# Patient Record
Sex: FEMALE | Race: BLACK OR AFRICAN AMERICAN | Employment: FULL TIME | URBAN - METROPOLITAN AREA
[De-identification: names, ages, dates, MRNs, and addresses within clinical notes are randomized per-mention and may not be internally consistent; named-entity substitution may affect disease eponyms.]

---

## 2018-02-20 PROBLEM — E66.01 OBESITY, MORBID (HCC): Status: ACTIVE | Noted: 2018-02-20

## 2018-03-28 ENCOUNTER — HOSPITAL ENCOUNTER (OUTPATIENT)
Dept: SLEEP MEDICINE | Age: 58
Discharge: HOME | End: 2018-03-28
Payer: COMMERCIAL

## 2018-03-28 PROCEDURE — 95811 POLYSOM 6/>YRS CPAP 4/> PARM: CPT

## 2018-04-19 PROBLEM — G47.33 OSA (OBSTRUCTIVE SLEEP APNEA): Status: ACTIVE | Noted: 2018-04-19

## 2019-11-17 ENCOUNTER — APPOINTMENT (OUTPATIENT)
Dept: CT IMAGING | Age: 59
DRG: 355 | End: 2019-11-17
Attending: EMERGENCY MEDICINE
Payer: COMMERCIAL

## 2019-11-17 ENCOUNTER — HOSPITAL ENCOUNTER (INPATIENT)
Age: 59
LOS: 2 days | Discharge: HOME | DRG: 355 | End: 2019-11-19
Attending: EMERGENCY MEDICINE | Admitting: SURGERY
Payer: COMMERCIAL

## 2019-11-17 DIAGNOSIS — K42.0 INCARCERATED UMBILICAL HERNIA: ICD-10-CM

## 2019-11-17 DIAGNOSIS — K56.609 SMALL BOWEL OBSTRUCTION (HCC): Primary | ICD-10-CM

## 2019-11-17 LAB
ALBUMIN SERPL-MCNC: 4 G/DL (ref 3.2–4.6)
ALBUMIN/GLOB SERPL: 0.9 {RATIO} (ref 1.2–3.5)
ALP SERPL-CCNC: 69 U/L (ref 50–130)
ALT SERPL-CCNC: 25 U/L (ref 12–65)
ANION GAP SERPL CALC-SCNC: 7 MMOL/L (ref 7–16)
AST SERPL-CCNC: 17 U/L (ref 15–37)
BACTERIA URNS QL MICRO: 0 /HPF
BASOPHILS # BLD: 0 K/UL (ref 0–0.2)
BASOPHILS NFR BLD: 0 % (ref 0–2)
BILIRUB SERPL-MCNC: 0.7 MG/DL (ref 0.2–1.1)
BUN SERPL-MCNC: 17 MG/DL (ref 8–23)
CALCIUM SERPL-MCNC: 9.8 MG/DL (ref 8.3–10.4)
CASTS URNS QL MICRO: ABNORMAL /LPF
CHLORIDE SERPL-SCNC: 102 MMOL/L (ref 98–107)
CO2 SERPL-SCNC: 29 MMOL/L (ref 21–32)
CREAT SERPL-MCNC: 0.71 MG/DL (ref 0.6–1)
CRYSTALS URNS QL MICRO: 0 /LPF
DIFFERENTIAL METHOD BLD: ABNORMAL
EOSINOPHIL # BLD: 0 K/UL (ref 0–0.8)
EOSINOPHIL NFR BLD: 0 % (ref 0.5–7.8)
EPI CELLS #/AREA URNS HPF: ABNORMAL /HPF
ERYTHROCYTE [DISTWIDTH] IN BLOOD BY AUTOMATED COUNT: 14.5 % (ref 11.9–14.6)
GLOBULIN SER CALC-MCNC: 4.6 G/DL (ref 2.3–3.5)
GLUCOSE SERPL-MCNC: 149 MG/DL (ref 65–100)
HCT VFR BLD AUTO: 42.8 % (ref 35.8–46.3)
HGB BLD-MCNC: 13.7 G/DL (ref 11.7–15.4)
IMM GRANULOCYTES # BLD AUTO: 0 K/UL (ref 0–0.5)
IMM GRANULOCYTES NFR BLD AUTO: 0 % (ref 0–5)
LYMPHOCYTES # BLD: 0.6 K/UL (ref 0.5–4.6)
LYMPHOCYTES NFR BLD: 6 % (ref 13–44)
MCH RBC QN AUTO: 26.9 PG (ref 26.1–32.9)
MCHC RBC AUTO-ENTMCNC: 32 G/DL (ref 31.4–35)
MCV RBC AUTO: 84.1 FL (ref 79.6–97.8)
MONOCYTES # BLD: 0.2 K/UL (ref 0.1–1.3)
MONOCYTES NFR BLD: 2 % (ref 4–12)
MUCOUS THREADS URNS QL MICRO: ABNORMAL /LPF
NEUTS SEG # BLD: 9.1 K/UL (ref 1.7–8.2)
NEUTS SEG NFR BLD: 91 % (ref 43–78)
NRBC # BLD: 0 K/UL (ref 0–0.2)
PLATELET # BLD AUTO: 358 K/UL (ref 150–450)
PMV BLD AUTO: 8.7 FL (ref 9.4–12.3)
POTASSIUM SERPL-SCNC: 3.7 MMOL/L (ref 3.5–5.1)
PROT SERPL-MCNC: 8.6 G/DL (ref 6.3–8.2)
RBC # BLD AUTO: 5.09 M/UL (ref 4.05–5.2)
RBC #/AREA URNS HPF: 0 /HPF
SODIUM SERPL-SCNC: 138 MMOL/L (ref 136–145)
WBC # BLD AUTO: 10 K/UL (ref 4.3–11.1)
WBC URNS QL MICRO: ABNORMAL /HPF

## 2019-11-17 PROCEDURE — 74011000250 HC RX REV CODE- 250: Performed by: EMERGENCY MEDICINE

## 2019-11-17 PROCEDURE — 96374 THER/PROPH/DIAG INJ IV PUSH: CPT | Performed by: EMERGENCY MEDICINE

## 2019-11-17 PROCEDURE — 81003 URINALYSIS AUTO W/O SCOPE: CPT | Performed by: EMERGENCY MEDICINE

## 2019-11-17 PROCEDURE — 74011250636 HC RX REV CODE- 250/636: Performed by: SURGERY

## 2019-11-17 PROCEDURE — 74011250637 HC RX REV CODE- 250/637: Performed by: EMERGENCY MEDICINE

## 2019-11-17 PROCEDURE — 99284 EMERGENCY DEPT VISIT MOD MDM: CPT | Performed by: EMERGENCY MEDICINE

## 2019-11-17 PROCEDURE — 77030020263 HC SOL INJ SOD CL0.9% LFCR 1000ML

## 2019-11-17 PROCEDURE — 74011250636 HC RX REV CODE- 250/636: Performed by: EMERGENCY MEDICINE

## 2019-11-17 PROCEDURE — 74011636320 HC RX REV CODE- 636/320: Performed by: EMERGENCY MEDICINE

## 2019-11-17 PROCEDURE — 96361 HYDRATE IV INFUSION ADD-ON: CPT | Performed by: EMERGENCY MEDICINE

## 2019-11-17 PROCEDURE — 74177 CT ABD & PELVIS W/CONTRAST: CPT

## 2019-11-17 PROCEDURE — 85025 COMPLETE CBC W/AUTO DIFF WBC: CPT

## 2019-11-17 PROCEDURE — 80053 COMPREHEN METABOLIC PANEL: CPT

## 2019-11-17 PROCEDURE — 81015 MICROSCOPIC EXAM OF URINE: CPT

## 2019-11-17 PROCEDURE — 74011000258 HC RX REV CODE- 258: Performed by: EMERGENCY MEDICINE

## 2019-11-17 PROCEDURE — 65270000029 HC RM PRIVATE

## 2019-11-17 RX ORDER — HYOSCYAMINE SULFATE 0.12 MG/1
0.25 TABLET SUBLINGUAL
Status: COMPLETED | OUTPATIENT
Start: 2019-11-17 | End: 2019-11-17

## 2019-11-17 RX ORDER — SODIUM CHLORIDE 9 MG/ML
75 INJECTION, SOLUTION INTRAVENOUS CONTINUOUS
Status: DISCONTINUED | OUTPATIENT
Start: 2019-11-17 | End: 2019-11-19 | Stop reason: HOSPADM

## 2019-11-17 RX ORDER — CLINDAMYCIN PHOSPHATE 900 MG/50ML
900 INJECTION INTRAVENOUS
Status: DISCONTINUED | OUTPATIENT
Start: 2019-11-17 | End: 2019-11-18 | Stop reason: SDUPTHER

## 2019-11-17 RX ORDER — SODIUM CHLORIDE 0.9 % (FLUSH) 0.9 %
10 SYRINGE (ML) INJECTION
Status: COMPLETED | OUTPATIENT
Start: 2019-11-17 | End: 2019-11-17

## 2019-11-17 RX ORDER — ONDANSETRON 2 MG/ML
4 INJECTION INTRAMUSCULAR; INTRAVENOUS
Status: COMPLETED | OUTPATIENT
Start: 2019-11-17 | End: 2019-11-17

## 2019-11-17 RX ORDER — HYDROMORPHONE HYDROCHLORIDE 1 MG/ML
0.5 INJECTION, SOLUTION INTRAMUSCULAR; INTRAVENOUS; SUBCUTANEOUS
Status: DISCONTINUED | OUTPATIENT
Start: 2019-11-17 | End: 2019-11-19 | Stop reason: HOSPADM

## 2019-11-17 RX ORDER — ONDANSETRON 2 MG/ML
4 INJECTION INTRAMUSCULAR; INTRAVENOUS
Status: DISCONTINUED | OUTPATIENT
Start: 2019-11-17 | End: 2019-11-19 | Stop reason: HOSPADM

## 2019-11-17 RX ORDER — SODIUM CHLORIDE 0.9 % (FLUSH) 0.9 %
5-40 SYRINGE (ML) INJECTION AS NEEDED
Status: DISCONTINUED | OUTPATIENT
Start: 2019-11-17 | End: 2019-11-19 | Stop reason: HOSPADM

## 2019-11-17 RX ORDER — SODIUM CHLORIDE 0.9 % (FLUSH) 0.9 %
5-40 SYRINGE (ML) INJECTION EVERY 8 HOURS
Status: DISCONTINUED | OUTPATIENT
Start: 2019-11-17 | End: 2019-11-19 | Stop reason: HOSPADM

## 2019-11-17 RX ADMIN — Medication 10 ML: at 21:23

## 2019-11-17 RX ADMIN — FAMOTIDINE 20 MG: 10 INJECTION, SOLUTION INTRAVENOUS at 22:27

## 2019-11-17 RX ADMIN — HYOSCYAMINE SULFATE 0.25 MG: 0.12 TABLET ORAL; SUBLINGUAL at 21:09

## 2019-11-17 RX ADMIN — SODIUM CHLORIDE 1000 ML: 900 INJECTION, SOLUTION INTRAVENOUS at 21:09

## 2019-11-17 RX ADMIN — ONDANSETRON 4 MG: 2 INJECTION INTRAMUSCULAR; INTRAVENOUS at 21:09

## 2019-11-17 RX ADMIN — SODIUM CHLORIDE 125 ML/HR: 900 INJECTION, SOLUTION INTRAVENOUS at 23:10

## 2019-11-17 RX ADMIN — Medication 5 ML: at 22:07

## 2019-11-17 RX ADMIN — IOPAMIDOL 100 ML: 755 INJECTION, SOLUTION INTRAVENOUS at 21:23

## 2019-11-17 RX ADMIN — SODIUM CHLORIDE 100 ML: 900 INJECTION, SOLUTION INTRAVENOUS at 21:24

## 2019-11-18 ENCOUNTER — ANESTHESIA EVENT (OUTPATIENT)
Dept: SURGERY | Age: 59
DRG: 355 | End: 2019-11-18
Payer: COMMERCIAL

## 2019-11-18 ENCOUNTER — ANESTHESIA (OUTPATIENT)
Dept: SURGERY | Age: 59
DRG: 355 | End: 2019-11-18
Payer: COMMERCIAL

## 2019-11-18 LAB
ABO + RH BLD: NORMAL
APTT PPP: 24.8 SEC (ref 24.7–39.8)
BLOOD GROUP ANTIBODIES SERPL: NORMAL
INR PPP: 1
PROTHROMBIN TIME: 13.4 SEC (ref 11.7–14.5)
SPECIMEN EXP DATE BLD: NORMAL

## 2019-11-18 PROCEDURE — 65270000029 HC RM PRIVATE

## 2019-11-18 PROCEDURE — 77030019908 HC STETH ESOPH SIMS -A: Performed by: NURSE ANESTHETIST, CERTIFIED REGISTERED

## 2019-11-18 PROCEDURE — 77030003029 HC SUT VCRL J&J -B: Performed by: SURGERY

## 2019-11-18 PROCEDURE — 0WQF0ZZ REPAIR ABDOMINAL WALL, OPEN APPROACH: ICD-10-PCS | Performed by: SURGERY

## 2019-11-18 PROCEDURE — 76210000016 HC OR PH I REC 1 TO 1.5 HR: Performed by: SURGERY

## 2019-11-18 PROCEDURE — 77030020263 HC SOL INJ SOD CL0.9% LFCR 1000ML

## 2019-11-18 PROCEDURE — 77030002996 HC SUT SLK J&J -A: Performed by: SURGERY

## 2019-11-18 PROCEDURE — 74011250636 HC RX REV CODE- 250/636: Performed by: SURGERY

## 2019-11-18 PROCEDURE — 77030008462 HC STPLR SKN PROX J&J -A: Performed by: SURGERY

## 2019-11-18 PROCEDURE — 77030039425 HC BLD LARYNG TRULITE DISP TELE -A: Performed by: ANESTHESIOLOGY

## 2019-11-18 PROCEDURE — 85610 PROTHROMBIN TIME: CPT

## 2019-11-18 PROCEDURE — 86900 BLOOD TYPING SEROLOGIC ABO: CPT

## 2019-11-18 PROCEDURE — 77030040361 HC SLV COMPR DVT MDII -B: Performed by: SURGERY

## 2019-11-18 PROCEDURE — 74011000250 HC RX REV CODE- 250: Performed by: NURSE ANESTHETIST, CERTIFIED REGISTERED

## 2019-11-18 PROCEDURE — 77030037088 HC TUBE ENDOTRACH ORAL NSL COVD-A: Performed by: ANESTHESIOLOGY

## 2019-11-18 PROCEDURE — 77030020255 HC SOL INJ LR 1000ML BG

## 2019-11-18 PROCEDURE — 74011250636 HC RX REV CODE- 250/636: Performed by: NURSE ANESTHETIST, CERTIFIED REGISTERED

## 2019-11-18 PROCEDURE — 77030002966 HC SUT PDS J&J -A: Performed by: SURGERY

## 2019-11-18 PROCEDURE — 74011250637 HC RX REV CODE- 250/637: Performed by: SURGERY

## 2019-11-18 PROCEDURE — 76060000033 HC ANESTHESIA 1 TO 1.5 HR: Performed by: SURGERY

## 2019-11-18 PROCEDURE — 36415 COLL VENOUS BLD VENIPUNCTURE: CPT

## 2019-11-18 PROCEDURE — 85730 THROMBOPLASTIN TIME PARTIAL: CPT

## 2019-11-18 PROCEDURE — 77030040830 HC CATH URETH FOL MDII -A: Performed by: SURGERY

## 2019-11-18 PROCEDURE — 76010000161 HC OR TIME 1 TO 1.5 HR INTENSV-TIER 1: Performed by: SURGERY

## 2019-11-18 RX ORDER — CLINDAMYCIN PHOSPHATE 900 MG/50ML
900 INJECTION INTRAVENOUS
Status: COMPLETED | OUTPATIENT
Start: 2019-11-18 | End: 2019-11-18

## 2019-11-18 RX ORDER — SUCCINYLCHOLINE CHLORIDE 20 MG/ML
INJECTION INTRAMUSCULAR; INTRAVENOUS AS NEEDED
Status: DISCONTINUED | OUTPATIENT
Start: 2019-11-18 | End: 2019-11-18 | Stop reason: HOSPADM

## 2019-11-18 RX ORDER — PANTOPRAZOLE SODIUM 40 MG/1
40 TABLET, DELAYED RELEASE ORAL
Status: DISCONTINUED | OUTPATIENT
Start: 2019-11-18 | End: 2019-11-19 | Stop reason: HOSPADM

## 2019-11-18 RX ORDER — KETAMINE HYDROCHLORIDE 50 MG/ML
INJECTION, SOLUTION INTRAMUSCULAR; INTRAVENOUS AS NEEDED
Status: DISCONTINUED | OUTPATIENT
Start: 2019-11-18 | End: 2019-11-18 | Stop reason: HOSPADM

## 2019-11-18 RX ORDER — ONDANSETRON 2 MG/ML
INJECTION INTRAMUSCULAR; INTRAVENOUS AS NEEDED
Status: DISCONTINUED | OUTPATIENT
Start: 2019-11-18 | End: 2019-11-18 | Stop reason: HOSPADM

## 2019-11-18 RX ORDER — PROPOFOL 10 MG/ML
INJECTION, EMULSION INTRAVENOUS AS NEEDED
Status: DISCONTINUED | OUTPATIENT
Start: 2019-11-18 | End: 2019-11-18 | Stop reason: HOSPADM

## 2019-11-18 RX ORDER — ACETAMINOPHEN 10 MG/ML
INJECTION, SOLUTION INTRAVENOUS AS NEEDED
Status: DISCONTINUED | OUTPATIENT
Start: 2019-11-18 | End: 2019-11-18 | Stop reason: HOSPADM

## 2019-11-18 RX ORDER — DOCUSATE SODIUM 100 MG/1
100 CAPSULE, LIQUID FILLED ORAL 2 TIMES DAILY
Status: DISCONTINUED | OUTPATIENT
Start: 2019-11-18 | End: 2019-11-19 | Stop reason: HOSPADM

## 2019-11-18 RX ORDER — ROCURONIUM BROMIDE 10 MG/ML
INJECTION, SOLUTION INTRAVENOUS AS NEEDED
Status: DISCONTINUED | OUTPATIENT
Start: 2019-11-18 | End: 2019-11-18 | Stop reason: HOSPADM

## 2019-11-18 RX ORDER — KETOROLAC TROMETHAMINE 30 MG/ML
INJECTION, SOLUTION INTRAMUSCULAR; INTRAVENOUS AS NEEDED
Status: DISCONTINUED | OUTPATIENT
Start: 2019-11-18 | End: 2019-11-18 | Stop reason: HOSPADM

## 2019-11-18 RX ORDER — LIDOCAINE HYDROCHLORIDE 20 MG/ML
INJECTION, SOLUTION EPIDURAL; INFILTRATION; INTRACAUDAL; PERINEURAL AS NEEDED
Status: DISCONTINUED | OUTPATIENT
Start: 2019-11-18 | End: 2019-11-18 | Stop reason: HOSPADM

## 2019-11-18 RX ORDER — FENTANYL CITRATE 50 UG/ML
INJECTION, SOLUTION INTRAMUSCULAR; INTRAVENOUS AS NEEDED
Status: DISCONTINUED | OUTPATIENT
Start: 2019-11-18 | End: 2019-11-18 | Stop reason: HOSPADM

## 2019-11-18 RX ORDER — EPHEDRINE SULFATE/0.9% NACL/PF 50 MG/5 ML
SYRINGE (ML) INTRAVENOUS AS NEEDED
Status: DISCONTINUED | OUTPATIENT
Start: 2019-11-18 | End: 2019-11-18 | Stop reason: HOSPADM

## 2019-11-18 RX ORDER — SODIUM CHLORIDE, SODIUM LACTATE, POTASSIUM CHLORIDE, CALCIUM CHLORIDE 600; 310; 30; 20 MG/100ML; MG/100ML; MG/100ML; MG/100ML
INJECTION, SOLUTION INTRAVENOUS
Status: DISCONTINUED | OUTPATIENT
Start: 2019-11-18 | End: 2019-11-18 | Stop reason: HOSPADM

## 2019-11-18 RX ADMIN — SODIUM CHLORIDE 125 ML/HR: 900 INJECTION, SOLUTION INTRAVENOUS at 08:19

## 2019-11-18 RX ADMIN — PANTOPRAZOLE SODIUM 40 MG: 40 TABLET, DELAYED RELEASE ORAL at 23:15

## 2019-11-18 RX ADMIN — KETAMINE HYDROCHLORIDE 50 MG: 50 INJECTION INTRAMUSCULAR; INTRAVENOUS at 17:35

## 2019-11-18 RX ADMIN — LIDOCAINE HYDROCHLORIDE 100 MG: 20 INJECTION, SOLUTION EPIDURAL; INFILTRATION; INTRACAUDAL; PERINEURAL at 17:24

## 2019-11-18 RX ADMIN — FENTANYL CITRATE 100 MCG: 50 INJECTION INTRAMUSCULAR; INTRAVENOUS at 17:21

## 2019-11-18 RX ADMIN — PROPOFOL 200 MG: 10 INJECTION, EMULSION INTRAVENOUS at 17:24

## 2019-11-18 RX ADMIN — ROCURONIUM BROMIDE 20 MG: 10 INJECTION, SOLUTION INTRAVENOUS at 17:35

## 2019-11-18 RX ADMIN — Medication 10 MG: at 17:24

## 2019-11-18 RX ADMIN — SUGAMMADEX 400 MG: 100 INJECTION, SOLUTION INTRAVENOUS at 18:05

## 2019-11-18 RX ADMIN — ACETAMINOPHEN 1000 MG: 10 INJECTION, SOLUTION INTRAVENOUS at 17:40

## 2019-11-18 RX ADMIN — ROCURONIUM BROMIDE 5 MG: 10 INJECTION, SOLUTION INTRAVENOUS at 17:24

## 2019-11-18 RX ADMIN — ONDANSETRON 4 MG: 2 INJECTION INTRAMUSCULAR; INTRAVENOUS at 17:30

## 2019-11-18 RX ADMIN — SODIUM CHLORIDE, SODIUM LACTATE, POTASSIUM CHLORIDE, AND CALCIUM CHLORIDE: 600; 310; 30; 20 INJECTION, SOLUTION INTRAVENOUS at 17:15

## 2019-11-18 RX ADMIN — CLINDAMYCIN PHOSPHATE 900 MG: 900 INJECTION, SOLUTION INTRAVENOUS at 17:15

## 2019-11-18 RX ADMIN — KETOROLAC TROMETHAMINE 30 MG: 30 INJECTION, SOLUTION INTRAMUSCULAR; INTRAVENOUS at 17:56

## 2019-11-18 RX ADMIN — SUCCINYLCHOLINE CHLORIDE 120 MG: 20 INJECTION, SOLUTION INTRAMUSCULAR; INTRAVENOUS at 17:24

## 2019-11-18 RX ADMIN — SODIUM CHLORIDE, SODIUM LACTATE, POTASSIUM CHLORIDE, AND CALCIUM CHLORIDE: 600; 310; 30; 20 INJECTION, SOLUTION INTRAVENOUS at 18:25

## 2019-11-18 NOTE — PROGRESS NOTES
Surgery and blood transfusion consent signed by pt and placed in chart. Pt has also completed her CHG bath. Will continue to monitor.

## 2019-11-18 NOTE — PROGRESS NOTES
TRANSFER - IN REPORT:    Verbal report received from Hien Fountain RN (name) on [facility] Caldwell  being received from 3rd med-surg (unit) for ordered procedure      Report consisted of patients Situation, Background, Assessment and   Recommendations(SBAR). Information from the following report(s) SBAR, Kardex and MAR was reviewed with the receiving nurse. Opportunity for questions and clarification was provided. Assessment completed upon patients arrival to unit and care assumed.

## 2019-11-18 NOTE — PROGRESS NOTES
TRANSFER - IN REPORT:    Verbal report received from 809 Michael Coreas RN(name) on [facility]  being received from ED(unit) for routine progression of care      Report consisted of patients Situation, Background, Assessment and   Recommendations(SBAR). Information from the following report(s) SBAR, Kardex, ED Summary, [facility] and Recent Results was reviewed with the receiving nurse. Opportunity for questions and clarification was provided. Assessment completed upon patients arrival to unit and care assumed.

## 2019-11-18 NOTE — PROGRESS NOTES
Shift assessment complete. Pt alert and oriented x4. Respirations even and unlabored. Lung sounds clear on room air. HR regular. Abdomen soft with hypoactive bowel sounds heard in all four quadrants. Skin intact, trace edema noted to BLE. IV patent and infusing. NGT intact to right nare to LIS per MD order. NGT canister with sanguinous output measuring at 150 mL. Pt NPO, verbalizes understanding. Pt denies pain and nausea. All needs met at this time. Safety measures in place, call light within reach, family at bedside. Will continue to monitor.

## 2019-11-18 NOTE — H&P
H&P/Consult Note/Progress Note/Office Note:   Annamaria Ann  MRN: [id number]  :1960  Age:59 y.o.    HPI: Annamaria Ann is a 60 y.o. female who is seen for incarcerated umbilical hernia. She had this hernia for long time, she developed abdominal pain with nausea, vomiting over last two days, and she went to ED, CT confirmed incarcerated hernia with SBO. She was hemodynamically stable with benign abdominal exam. Labs was unremarkable. She is obese, diabetic and hypertensive. Other medical issues are as below.            Past Medical History:   Diagnosis Date    Allergic rhinitis     mild, seasonal    Diabetes mellitus type 2, controlled ([address]     Hypertension     Iron deficiency anemia     Postmenopausal      Past Surgical History:   Procedure Laterality Date    HX OPEN REDUCTION INTERNAL FIXATION      right femur,      Current Facility-Administered Medications   Medication Dose Route Frequency    sodium chloride (NS) flush 5-40 mL  5-40 mL IntraVENous Q8H    sodium chloride (NS) flush 5-40 mL  5-40 mL IntraVENous PRN    0.9% sodium chloride infusion  125 mL/hr IntraVENous CONTINUOUS    HYDROmorphone (PF) (DILAUDID) injection 0.5 mg  0.5 mg IntraVENous Q4H PRN    ondansetron (ZOFRAN) injection 4 mg  4 mg IntraVENous Q4H PRN    clindamycin (CLEOCIN) 900mg D5W 50mL IVPB (premix)  900 mg IntraVENous ON CALL TO OR     Penicillins  Social History     Socioeconomic History    Marital status:      Spouse name: Not on file    Number of children: 0    Years of education: Not on file    Highest education level: Not on file   Tobacco Use    Smoking status: Never Smoker    Smokeless tobacco: Never Used   Substance and Sexual Activity    Alcohol use: No     Alcohol/week: 0.0 standard drinks     Comment: none    Drug use: No   Other Topics Concern    Special Diet No    Exercise No   Social History Narrative    , [place], in [place] [employer] 32yo      works [employer], Tech specialist at [address] History     Tobacco Use   Smoking Status Never Smoker   Smokeless Tobacco Never Used     Family History   Problem Relation Age of Onset    Arthritis-osteo Mother     Breast Cancer Maternal Aunt 36    Breast Cancer Maternal Aunt 36    Liver Disease Father         alcoholic cirrhosis     ROS: The patient has no difficulty with chest pain or shortness of breath. No fever or chills. Comprehensive review of systems was otherwise unremarkable except as noted above. Physical Exam:   Visit Vitals  /71 (BP 1 Location: Right arm, BP Patient Position: At rest;Supine; Head of bed elevated (Comment degrees))   Pulse 91   Temp 98.1 °F (36.7 °C)   Resp 18   SpO2 98%     Vitals:    11/17/19 1919 11/17/19 2257 11/18/19 0301   BP: 153/63 145/80 128/71   Pulse: 99 92 91   Resp: 18 18 18   Temp: 97.8 °F (36.6 °C) 98 °F (36.7 °C) 98.1 °F (36.7 °C)   SpO2: 96% 98% 98%     11/17 1901 - 11/18 0700  In: 1100 [I.V.:1100]  Out: 300 [Urine:300]  No intake/output data recorded. Constitutional: Alert, oriented, cooperative patient in no acute distress; appears stated age    Eyes:Sclera are clear. EOMs intact  ENMT: no external lesions gross hearing normal; no obvious neck masses, no ear or lip lesions, nares normal  CV: RRR. Normal perfusion  Resp: No JVD. Breathing is  non-labored; no audible wheezing. GI: soft and slightly distended, no tenderness or rebound. Musculoskeletal: unremarkable with normal function. No embolic signs or cyanosis.    Neuro:  Oriented; moves all 4; no focal deficits  Psychiatric: normal affect and mood, no memory impairment    Recent vitals (if inpt):  Patient Vitals for the past 24 hrs:   BP Temp Pulse Resp SpO2   11/18/19 0301 128/71 98.1 °F (36.7 °C) 91 18 98 %   11/17/19 2257 145/80 98 °F (36.7 °C) 92 18 98 %   11/17/19 1919 153/63 97.8 °F (36.6 °C) 99 18 96 %       Labs:  Recent Labs     11/18/19  0430 11/17/19 1958   WBC  --  10.0   HGB  --  13.7   PLT  --  358   NA  --  138   K  --  3.7   CL  --  102   CO2  --  29   BUN  --  17   CREA  --  0.71   GLU  --  149*   PTP 13.4  --    INR 1.0  --    APTT 24.8  --    TBILI  --  0.7   SGOT  --  17   ALT  --  25   AP  --  69       Lab Results   Component Value Date/Time    WBC 10.0 11/17/2019 07:58 PM    HGB 13.7 11/17/2019 07:58 PM    PLATELET 761 49/24/9538 07:58 PM    Sodium 138 11/17/2019 07:58 PM    Potassium 3.7 11/17/2019 07:58 PM    Chloride 102 11/17/2019 07:58 PM    CO2 29 11/17/2019 07:58 PM    BUN 17 11/17/2019 07:58 PM    Creatinine 0.71 11/17/2019 07:58 PM    Glucose 149 (H) 11/17/2019 07:58 PM    INR 1.0 11/18/2019 04:30 AM    aPTT 24.8 11/18/2019 04:30 AM    Bilirubin, total 0.7 11/17/2019 07:58 PM    AST (SGOT) 17 11/17/2019 07:58 PM    ALT (SGPT) 25 11/17/2019 07:58 PM    Alk. phosphatase 69 11/17/2019 07:58 PM       CT Results  (Last 48 hours)               11/17/19 2132  CT ABD PELV W CONT Final result    Impression:  IMPRESSION:       Incarcerated periumbilical hernia resulting in a small bowel obstruction. Enlarged fibroid uterus. Date of Dictation: 11/17/2019 9:32 PM               Narrative:  CT ABDOMEN AND PELVIS WITH CONTRAST       HISTORY: Abdominal pain       COMPARISON: None       TECHNIQUE: Helical imaging was performed from the lung bases through the ischial   tuberosities during the intravenous infusion of 100 cc of Isovue-370. Oral   contrast was not administered. Coronal and sagittal reformats were performed. Dose reduction techniques used: Automated exposure control, adjustment of the   mAs and/or kVp according to patient's size, and iterative reconstruction   techniques. FINDINGS:   *  LUNG BASES: Within normal limits. *  LIVER: Within normal limits. *  GALLBLADDER AND BILE DUCTS: Normal.   *  SPLEEN: Within normal limits. *  URINARY TRACT: Within normal limits. *  ADRENALS: Within normal limits.    * PANCREAS: Within normal limits. *  GASTROINTESTINAL TRACT: Small bowel obstruction. Normal appendix. *  RETROPERITONEUM: Within normal limits. *  PERITONEAL CAVITY AND ABDOMINAL WALL: Periumbilical hernia containing small   bowel resulting in a bowel obstruction. *  PELVIS: Enlarged fibroid uterus. *  SPINE / BONES: Within normal limits. *  OTHER COMMENTS: None. chest X-ray      I reviewed recent labs, recent radiologic studies, and pertinent records including other doctor notes if needed. I independently reviewed radiology images for studies I described above or studies I have ordered. Admission date (for inpatients): 11/17/2019   * No surgery found *  * No surgery found *    ASSESSMENT/PLAN:  Problem List  Date Reviewed: 2/20/2018          Codes Class Noted    SBO (small bowel obstruction) ([facility] ICD-10-CM: L34.730  ICD-9-CM: 560.9  11/17/2019        MORRIS (obstructive sleep apnea) ICD-10-CM: G47.33  ICD-9-CM: 327.23  4/19/2018        Obesity, morbid ([facility] ICD-10-CM: E66.01  ICD-9-CM: 278.01  2/20/2018        Hypertension ICD-10-CM: I10  ICD-9-CM: 401.9  Unknown        Diabetes mellitus type 2, controlled ([facility] ICD-10-CM: E11.9  ICD-9-CM: 250.00  Unknown        Allergic rhinitis ICD-10-CM: J30.9  ICD-9-CM: 477.9  Unknown    Overview Signed 12/7/2015  8:59 AM by Alisa Farah MD     mild, seasonal             Postmenopausal ICD-10-CM: Z78.0  ICD-9-CM: V49.81  Unknown        Iron deficiency anemia ICD-10-CM: D50.9  ICD-9-CM: 280.9  Unknown            Active Problems:    SBO (small bowel obstruction) ([facility] (11/17/2019)     with incarcerated umbilical hernia. Will repair surgically in urgent manner. May need bowel resection, may not be able to use mesh. I have personally performed a face-to-face diagnostic evaluation and management  service on this patient. I have independently seen the patient. I have independently obtained the above history from the patient/family. I have independently examined the patient with above findings. I have independently reviewed data/labs for this patient and developed the above plan of care (MDM).   Signed: Noemi Ivy MD, FACS

## 2019-11-18 NOTE — PROGRESS NOTES
Pt is resting quietly in bed. Bed is low and locked with call light in reach. resp is even and unlabored. Pt has NG tube present on low intermittent suction. The drainage from the NG tube is sanguinous. Pt has edema present on back of both BLE. Pt is A&O x4. No acute signs of distress, and no needs are voiced at this time.

## 2019-11-18 NOTE — ED NOTES
TRANSFER - OUT REPORT:    Verbal report given to Buzz Montgomery on [facility]  being transferred to [facility] for routine progression of care       Report consisted of patients Situation, Background, Assessment and   Recommendations(SBAR). Information from the following report(s) SBAR, Kardex, ED Summary, Procedure Summary, MAR and Recent Results was reviewed with the receiving nurse. Lines:       Opportunity for questions and clarification was provided.       Patient transported with:   Registered Nurse

## 2019-11-18 NOTE — PROGRESS NOTES
11/17/19 2310   Dual Skin Pressure Injury Assessment   Dual Skin Pressure Injury Assessment WDL   Second Care Provider (Based on [address] [place] [address]) Indra Wallace RN   Skin Integumentary   Skin Integumentary (WDL) WDL   Skin Color Appropriate for ethnicity   Skin Condition/Temp Dry; Warm   Skin Integrity Intact   Turgor Epidermis thin w/ loss of subcut tissue

## 2019-11-18 NOTE — PROGRESS NOTES
Problem: Falls - Risk of  Goal: *Absence of Falls  Description  Document Dick Host Fall Risk and appropriate interventions in the flowsheet.   Outcome: Progressing Towards Goal  Note:   Fall Risk Interventions:            Medication Interventions: Teach patient to arise slowly

## 2019-11-18 NOTE — PROGRESS NOTES
TRANSFER - OUT REPORT:    Verbal report given to Nadya Contreras RN (name) on [facility]  being transferred to [facility]. (unit) for ordered procedure       Report consisted of patients Situation, Background, Assessment and   Recommendations(SBAR). Information from the following report(s) SBAR, Kardex, Intake/Output and MAR was reviewed with the receiving nurse. Lines:   Peripheral IV 11/17/19 Left Antecubital (Active)   Site Assessment Clean, dry, & intact 11/18/2019  8:23 AM   Phlebitis Assessment 0 11/18/2019  8:23 AM   Infiltration Assessment 0 11/18/2019  8:23 AM   Dressing Status Clean, dry, & intact 11/18/2019  8:23 AM   Dressing Type Tape;Transparent 11/18/2019  8:23 AM   Hub Color/Line Status Patent; Infusing 11/18/2019  8:23 AM        Opportunity for questions and clarification was provided.       Patient transported with:

## 2019-11-18 NOTE — BRIEF OP NOTE
BRIEF OPERATIVE NOTE    Date of Procedure: 11/18/2019   Preoperative Diagnosis: INCARCERATED UMBILICAL HERNIA  Postoperative Diagnosis: INCARCERATED UMBILICAL HERNIA    Procedure(s):  LAPAROTOMY EXPLORATORY / UMBILICAL HERNIA REPAIR  Surgeon(s) and Role:     Michelle Antonio MD - Primary         Surgical Staff:  Circ-1: Majo Singer RN  Scrub Tech-1: Justice Avednaño  Scrub Tech-2: Sandrita Eden  Event Time In Time Out   Incision Start 11/18/2019  5:34 PM    Incision Close 11/18/2019  6:02 PM      Anesthesia: General   Estimated Blood Loss: minimal  Specimens: * No specimens in log *   Findings: distended but viable small bowel,   Complications: none  Implants: * No implants in log *

## 2019-11-18 NOTE — PROGRESS NOTES
Spiritual Care Visit, initial visit. Visited with patient at bedside. Prayed for patient's healing and health. Visit by Reji Robles, Staff .  Isabel., Karina.B., B.A.

## 2019-11-18 NOTE — ED PROVIDER NOTES
77-year-old female presents with what she thinks is a stomach virus. She developed abdominal pain last night at midnight after eating some rotisserie chicken. She did not start vomiting until later today around 8 or 9 AM.  She is thrown up maybe 2 or 3 times, had maybe one loose stool. Pain is diffuse crampy in nature but she has had decreased appetite through the rest of the day really felt like she could not keep anything down if she tried to eat    No one at home sick with any abdominal complaints.            Past Medical History:   Diagnosis Date    Allergic rhinitis     mild, seasonal    Diabetes mellitus type 2, controlled ([facility] 2005    Hypertension 2005    Iron deficiency anemia     Postmenopausal        Past Surgical History:   Procedure Laterality Date    HX OPEN REDUCTION INTERNAL FIXATION  1995    right femur,          Family History:   Problem Relation Age of Onset    Arthritis-osteo Mother     Breast Cancer Maternal Aunt 36    Breast Cancer Maternal Aunt 36    Liver Disease Father         alcoholic cirrhosis       Social History     Socioeconomic History    Marital status:      Spouse name: Not on file    Number of children: 0    Years of education: Not on file    Highest education level: Not on file   Occupational History    Not on file   Social Needs    Financial resource strain: Not on file    Food insecurity:     Worry: Not on file     Inability: Not on file    Transportation needs:     Medical: Not on file     Non-medical: Not on file   Tobacco Use    Smoking status: Never Smoker    Smokeless tobacco: Never Used   Substance and Sexual Activity    Alcohol use: No     Alcohol/week: 0.0 standard drinks     Comment: none    Drug use: No    Sexual activity: Not on file   Lifestyle    Physical activity:     Days per week: Not on file     Minutes per session: Not on file    Stress: Not on file   Relationships    Social connections:     Talks on phone: Not on file Gets together: Not on file     Attends Mandaen service: Not on file     Active member of club or organization: Not on file     Attends meetings of clubs or organizations: Not on file     Relationship status: Not on file    Intimate partner violence:     Fear of current or ex partner: Not on file     Emotionally abused: Not on file     Physically abused: Not on file     Forced sexual activity: Not on file   Other Topics Concern     Service Not Asked    Blood Transfusions Not Asked    Caffeine Concern Not Asked    Occupational Exposure Not Asked   Terri Duos Hazards Not Asked    Sleep Concern Not Asked    Stress Concern Not Asked    Weight Concern Not Asked    Special Diet No    Back Care Not Asked    Exercise No    Bike Helmet Not Asked   [address] Not Asked    Self-Exams Not Asked   Social History Narrative    , [employer], in [employer] 32yo      works [employer], [employer]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 HENT: Negative for rhinorrhea and sore throat. Eyes: Negative for discharge and redness. Respiratory: Negative for cough and shortness of breath. Cardiovascular: Negative for chest pain and palpitations. Gastrointestinal: Positive for abdominal pain, nausea and vomiting. Negative for diarrhea. Musculoskeletal: Negative for arthralgias and back pain. Skin: Negative for rash. Neurological: Negative for dizziness and headaches. All other systems reviewed and are negative. Vitals:    11/17/19 1919   BP: 153/63   Pulse: 99   Resp: 18   Temp: 97.8 °F (36.6 °C)   SpO2: 96%            Physical Exam   Constitutional: She is oriented to person, place, and time. She appears well-developed and well-nourished. No distress. HENT:   Head: Normocephalic and atraumatic. Eyes: Pupils are equal, round, and reactive to light.  Conjunctivae are normal. Right eye exhibits no discharge. Left eye exhibits no discharge. No scleral icterus. Neck: Normal range of motion. Neck supple. Cardiovascular: Normal rate, regular rhythm and normal heart sounds. Exam reveals no gallop. No murmur heard. Pulmonary/Chest: Effort normal and breath sounds normal. No respiratory distress. She has no wheezes. She has no rales. Abdominal: Soft. Bowel sounds are normal. There is generalized tenderness (mild). There is no guarding. A hernia (Soft nontender umbilical hernia mass noted. Due to her general habitus it is difficult to tell whether it is fully reduced or not.) is present. Musculoskeletal: Normal range of motion. She exhibits no edema. Neurological: She is alert and oriented to person, place, and time. She exhibits normal muscle tone. cni 2-12 grossly   Skin: Skin is warm and dry. She is not diaphoretic. Psychiatric: She has a normal mood and affect. Her behavior is normal.   Nursing note and vitals reviewed. MDM  Number of Diagnoses or Management Options  Incarcerated umbilical hernia:   Small bowel obstruction [facility]):   Diagnosis management comments: Medical decision making note:  Abdominal pain with nausea vomiting, labs are normal started a good 6 or 7 hours before the vomiting. Going to pursue CT scanning to rule out diverticulitis or other cause needing treatment  Is certainly a strong possibility    9:55 PM  CT scan reviewed, surgeon notified, NG tube ordered. This concludes the \"medical decision making note\" part of this emergency department visit note.            Procedures

## 2019-11-18 NOTE — ANESTHESIA PREPROCEDURE EVALUATION
Relevant Problems   No relevant active problems       Anesthetic History   No history of anesthetic complications            Review of Systems / Medical History  Patient summary reviewed and pertinent labs reviewed    Pulmonary        Sleep apnea           Neuro/Psych              Cardiovascular    Hypertension              Exercise tolerance: >4 METS     GI/Hepatic/Renal                Endo/Other    Diabetes    Obesity     Other Findings              Physical Exam    Airway  Mallampati: III  TM Distance: 4 - 6 cm  Neck ROM: normal range of motion   Mouth opening: Normal    Comments: NGT in place draining yellow clear fluid Cardiovascular    Rhythm: regular  Rate: normal      Pertinent negatives: No murmur   Dental  No notable dental hx       Pulmonary                 Abdominal         Other Findings            Anesthetic Plan    ASA: 2  Anesthesia type: general          Induction: Intravenous  Anesthetic plan and risks discussed with: Patient      FIORDALIZA

## 2019-11-19 VITALS
RESPIRATION RATE: 18 BRPM | TEMPERATURE: 98.1 F | SYSTOLIC BLOOD PRESSURE: 103 MMHG | HEART RATE: 89 BPM | OXYGEN SATURATION: 93 % | BODY MASS INDEX: 34.76 KG/M2 | WEIGHT: 184.08 LBS | DIASTOLIC BLOOD PRESSURE: 64 MMHG | HEIGHT: 61 IN

## 2019-11-19 LAB — GLUCOSE BLD STRIP.AUTO-MCNC: 87 MG/DL (ref 65–100)

## 2019-11-19 PROCEDURE — 82962 GLUCOSE BLOOD TEST: CPT

## 2019-11-19 PROCEDURE — 74011250637 HC RX REV CODE- 250/637: Performed by: SURGERY

## 2019-11-19 PROCEDURE — 77030020263 HC SOL INJ SOD CL0.9% LFCR 1000ML

## 2019-11-19 RX ORDER — HYDROCODONE BITARTRATE AND ACETAMINOPHEN 5; 325 MG/1; MG/1
1 TABLET ORAL
Qty: 20 TAB | Refills: 0 | Status: SHIPPED | OUTPATIENT
Start: 2019-11-19 | End: 2019-11-24

## 2019-11-19 RX ORDER — DOCUSATE SODIUM 100 MG/1
100 CAPSULE, LIQUID FILLED ORAL 2 TIMES DAILY
Qty: 60 CAP | Refills: 0 | Status: SHIPPED | OUTPATIENT
Start: 2019-11-19 | End: 2019-12-19

## 2019-11-19 RX ADMIN — PANTOPRAZOLE SODIUM 40 MG: 40 TABLET, DELAYED RELEASE ORAL at 08:53

## 2019-11-19 NOTE — DISCHARGE INSTRUCTIONS
Diet: GI soft. Activity: No heavy lifting over 15 lbs for 6 weeks. DISCHARGE SUMMARY from Nurse    PATIENT INSTRUCTIONS:    After general anesthesia or intravenous sedation, for 24 hours or while taking prescription Narcotics:  · Limit your activities  · Do not drive and operate hazardous machinery  · Do not make important personal or business decisions  · Do  not drink alcoholic beverages  · If you have not urinated within 8 hours after discharge, please contact your surgeon on call. Report the following to your surgeon:  · Excessive pain, swelling, redness or odor of or around the surgical area  · Temperature over 100.5  · Nausea and vomiting lasting longer than 4 hours or if unable to take medications  · Any signs of decreased circulation or nerve impairment to extremity: change in color, persistent  numbness, tingling, coldness or increase pain  · Any questions    What to do at Home:  *  Please give a list of your current medications to your Primary Care Provider. *  Please update this list whenever your medications are discontinued, doses are      changed, or new medications (including over-the-counter products) are added. *  Please carry medication information at all times in case of emergency situations. These are general instructions for a healthy lifestyle:    No smoking/ No tobacco products/ Avoid exposure to second hand smoke  Surgeon General's Warning:  Quitting smoking now greatly reduces serious risk to your health. Obesity, smoking, and sedentary lifestyle greatly increases your risk for illness    A healthy diet, regular physical exercise & weight monitoring are important for maintaining a healthy lifestyle    You may be retaining fluid if you have a history of heart failure or if you experience any of the following symptoms:  Weight gain of 3 pounds or more overnight or 5 pounds in a week, increased swelling in our hands or feet or shortness of breath while lying flat in bed. Please call your doctor as soon as you notice any of these symptoms; do not wait until your next office visit. The discharge information has been reviewed with the patient. The patient verbalized understanding. Discharge medications reviewed with the patient and appropriate educational materials and side effects teaching were provided.   ___________________________________________________________________________________________________________________________________

## 2019-11-19 NOTE — PROGRESS NOTES
Pt is resting quietly in bed. Bed is low and locked with call light in reach. resp is even and unlabored. Pt has a midline incision on abd that is covered with ABD pad. ABD pad is c/d/i. Pt is A&O x4. No acute signs of distress, and no needs are voiced at this time.

## 2019-11-19 NOTE — PROGRESS NOTES
PLAN:  Continue CLD  Advance slowly as bowel function returns  D/C Patel  Change dressing- keep umbilical bolster in place  Pain control  Prophylaxis with SCDs, IS, Protonix. ASSESSMENT:  Admit Date: 11/17/2019   1 Day Post-Op  Procedure(s):  LAPAROTOMY EXPLORATORY / UMBILICAL HERNIA REPAIR    Principal Problem:    SBO (small bowel obstruction) (HCC) (11/17/2019)         SUBJECTIVE:  AF, VSS, on RA. BM X 2- loose. -N/V. Tolerating CLD. OBJECTIVE:  Constitutional: Alert oriented cooperative patient in no acute distress; appears stated age   Visit Vitals  /67   Pulse 80   Temp 98.5 °F (36.9 °C)   Resp 16   Ht 5' 0.98\" (1.549 m)   Wt 184 lb 1.4 oz (83.5 kg)   SpO2 93%   Breastfeeding? No   BMI 34.80 kg/m²     Eyes:Sclera are clear. ENMT: no external lesions gross hearing normal; no obvious neck masses, no ear or lip lesions  CV: RRR. Normal perfusion  Resp: No JVD. Breathing is  non-labored; no audible wheezing. CTAB. GI: soft and non-distended; appropriately tender. Dressing c/d/i. + BS. Musculoskeletal: unremarkable with normal function. No embolic signs or cyanosis.    Neuro:  Oriented; moves all 4; no focal deficits  Psychiatric: normal affect and mood, no memory impairment      Patient Vitals for the past 24 hrs:   BP Temp Pulse Resp SpO2 Height Weight   11/19/19 0309 110/67 98.5 °F (36.9 °C) 80 16 93 %     11/18/19 2319 111/52 98.9 °F (37.2 °C) 76 16 94 %     11/18/19 1958 121/73 96.8 °F (36 °C) 83 16 90 %     11/18/19 1929 129/67  79 16 93 %     11/18/19 1914 122/66  84 16 93 %     11/18/19 1859 123/68  81 16 93 %     11/18/19 1844 131/66  87 16 99 %     11/18/19 1839 101/60  89 16 99 %     11/18/19 1834 125/62  87 16 99 %     11/18/19 1829 119/61 99.1 °F (37.3 °C) 91 16 97 %     11/18/19 1348      5' 0.98\" (1.549 m) 184 lb 1.4 oz (83.5 kg)   11/18/19 1149 117/71 97.9 °F (36.6 °C) 83 16 96 %     11/18/19 0828 131/64 97.6 °F (36.4 °C) 93 18 95 %       Labs: Recent Labs     11/18/19  0430 11/17/19 1958   WBC  --  10.0   HGB  --  13.7   PLT  --  358   NA  --  138   K  --  3.7   CL  --  102   CO2  --  29   BUN  --  17   CREA  --  0.71   GLU  --  149*   PTP 13.4  --    INR 1.0  --    APTT 24.8  --    TBILI  --  0.7   SGOT  --  17   ALT  --  25   AP  --  69     Lawrence Singh [facility]

## 2019-11-19 NOTE — ANESTHESIA POSTPROCEDURE EVALUATION
Procedure(s):  LAPAROTOMY EXPLORATORY / UMBILICAL HERNIA REPAIR.     general    Anesthesia Post Evaluation      Multimodal analgesia: multimodal analgesia used between 6 hours prior to anesthesia start to PACU discharge  Patient location during evaluation: bedside  Patient participation: complete - patient participated  Level of consciousness: awake and responsive to light touch  Pain management: adequate  Airway patency: patent  Anesthetic complications: no  Cardiovascular status: acceptable, hemodynamically stable, blood pressure returned to baseline and stable  Respiratory status: acceptable, unassisted, spontaneous ventilation and nonlabored ventilation  Hydration status: acceptable  Post anesthesia nausea and vomiting:  controlled      Vitals Value Taken Time   /67 11/18/2019  7:29 PM   Temp 37.3 °C (99.1 °F) 11/18/2019  6:29 PM   Pulse 79 11/18/2019  7:29 PM   Resp 16 11/18/2019  7:29 PM   SpO2 93 % 11/18/2019  7:29 PM

## 2019-11-19 NOTE — PROGRESS NOTES
Pt's NG tube removed. Pt handled the activity well. No acute signs of distress. No needs are voiced at this time.

## 2019-11-19 NOTE — OP NOTES
[place]  OPERATIVE REPORT    Name:  Varghese Abdul  MR#:  [id number]  :  1960  ACCOUNT #:  [de-identified]  DATE OF SERVICE:  2019    PREOPERATIVE DIAGNOSIS:  Incarcerated umbilical hernia with small bowel obstruction. POSTOPERATIVE DIAGNOSIS:  Incarcerated umbilical hernia with small bowel obstruction. PROCEDURE PERFORMED:  Exploratory laparotomy with umbilical hernia repair without mesh. SURGEON:  Julissa Sloan MD.    ANESTHESIA:  general    COMPLICATIONS:  none    SPECIMENS REMOVED: hernia sac    IMPLANTS:  none    ESTIMATED BLOOD LOSS:  10 mL. INDICATIONS:  This is a 62-year-old female. She is morbidly obese. She has a long history of umbilical hernia. She presented to the emergency room with 2-day history of nausea, vomiting and abdominal pain. CT scan showed incarcerated umbilical hernia with small bowel obstruction. The patient was admitted to the hospital and then urgent surgery offered to her. She understood the risks and benefits, and agreed to proceed. FINDING:  She does have some distended small bowel but the intestine are viable. PROCEDURE:  After informed consent obtained, the patient was brought to the operating room lying in supine position. General anesthesia was administered. The patient was then prepped and draped in the usual routine fashion. A vertical incision was made right above and below the umbilicus. Dissection was carried through the skin and then the hernia sac was immediately encountered and the sac was entered and there were extensive adhesions from omentum to the hernia sac. This was carefully taken down and then the bowel was able to reduce easily and no evidence of ischemic bowel. Then the hernia sac was dissected off the subcu tissues and dissected off the umbilicus and the sac was removed.   She does have generally weak fascia throughout the midline; however, I chose not to use a mesh in this emergency surgery setting and closed the fascia bilaterally and then with #1 looped PDS. The fascia was closed. There was no tension during closure. Then the umbilicus was cleaned off the hernia sac and reattached down to the fascia with #3-0 Vicryl stitches, skin closed with staples. The patient tolerated the procedure well and transferred to recovery in stable condition. All the instrument counts and lap counts were correct. Estimated blood loss was about 10 mL.         Chava Liang MD      [id number]  D:  11/18/2019 22:37  T:  11/19/2019 3:53  JOB #:  [id number]

## 2019-11-19 NOTE — PROGRESS NOTES
TRANSFER - IN REPORT:    Verbal report received from [address] RN(name) on [facility]  being received from [place]) for routine post - op      Report consisted of patients Situation, Background, Assessment and   Recommendations(SBAR). Information from the following report(s) SBAR, Procedure Summary, Intake/Output and MAR was reviewed with the receiving nurse. Opportunity for questions and clarification was provided. Assessment completed upon patients arrival to unit and care assumed.

## 2019-11-19 NOTE — ROUTINE PROCESS
TRANSFER - OUT REPORT: 
 
Verbal report given to london caruso(name) on Herrera Gill  being transferred to med/surg(unit) for routine post - op Report consisted of patients Situation, Background, Assessment and  
Recommendations(SBAR). Information from the following report(s) SBAR was reviewed with the receiving nurse. Lines:  
Peripheral IV 11/17/19 Left Antecubital (Active) Site Assessment Clean, dry, & intact 11/18/2019  6:29 PM  
Phlebitis Assessment 0 11/18/2019  6:29 PM  
Infiltration Assessment 0 11/18/2019  6:29 PM  
Dressing Status Clean, dry, & intact 11/18/2019  6:29 PM  
Dressing Type Transparent 11/18/2019  6:29 PM  
Hub Color/Line Status Blue 11/18/2019  6:29 PM  
  
 
Opportunity for questions and clarification was provided. Patient transported with: 
 O2 @ 0 liters

## 2019-11-19 NOTE — PROGRESS NOTES
Pt feeling this am. Siobhan some clears; denies nausea; minimal discomfort. Staple line with some gauze in umbilical area is dry and intact. Patel with yellow urine. Is ambulatory; spouse at bedside.

## 2019-12-13 NOTE — CDMP QUERY
Patient admitted with a SBO--incarcerated umbilical hernia. Melecio Lang Op note states- hernia sac was immediately encountered and the sac was entered and there were extensive adhesions from omentum to the hernia sac. Please clarify if 
---taking down the extensive adhesions increased surgical time significantly ---no significant time was added to the procedure with the adhesiolysis =>Other Explanation of clinical findings =>Unable to Determine (no explanation of clinical findings) Treatment:  
Please clarify and document your clinical opinion in the progress notes and discharge summary including the definitive and/or presumptive diagnosis, (suspected or probable), related to the above clinical findings. Please include clinical findings supporting your diagnosis. Thanks, Ela Rose RN, [facility] Compliant Documentation Management Program 
[phone]

## 2022-03-09 ENCOUNTER — TRANSCRIBE ORDER (OUTPATIENT)
Dept: SCHEDULING | Age: 62
End: 2022-03-09

## 2022-03-09 DIAGNOSIS — M84.451A PATHOLOGICAL FRACTURE OF RIGHT FEMUR, UNSPECIFIED PATHOLOGICAL CAUSE, INITIAL ENCOUNTER (HCC): Primary | ICD-10-CM

## 2022-03-19 PROBLEM — K56.609 SBO (SMALL BOWEL OBSTRUCTION) (HCC): Status: ACTIVE | Noted: 2019-11-17

## 2022-03-19 PROBLEM — E66.01 OBESITY, MORBID (HCC): Status: ACTIVE | Noted: 2018-02-20

## 2022-03-19 PROBLEM — G47.33 OSA (OBSTRUCTIVE SLEEP APNEA): Status: ACTIVE | Noted: 2018-04-19

## 2022-08-21 ENCOUNTER — HOSPITAL ENCOUNTER (INPATIENT)
Age: 62
LOS: 3 days | Discharge: HOME | DRG: 355 | End: 2022-08-25
Attending: EMERGENCY MEDICINE | Admitting: SURGERY
Payer: COMMERCIAL

## 2022-08-21 ENCOUNTER — ANESTHESIA EVENT (OUTPATIENT)
Dept: SURGERY | Age: 62
DRG: 355 | End: 2022-08-21
Payer: COMMERCIAL

## 2022-08-21 ENCOUNTER — HOSPITAL ENCOUNTER (EMERGENCY)
Dept: CT IMAGING | Age: 62
Discharge: HOME | DRG: 355 | End: 2022-08-24
Payer: COMMERCIAL

## 2022-08-21 ENCOUNTER — ANESTHESIA (OUTPATIENT)
Dept: SURGERY | Age: 62
DRG: 355 | End: 2022-08-21
Payer: COMMERCIAL

## 2022-08-21 ENCOUNTER — HOSPITAL ENCOUNTER (EMERGENCY)
Dept: GENERAL RADIOLOGY | Age: 62
Discharge: HOME | DRG: 355 | End: 2022-08-24
Payer: COMMERCIAL

## 2022-08-21 DIAGNOSIS — K56.609 SBO (SMALL BOWEL OBSTRUCTION) (HCC): Primary | ICD-10-CM

## 2022-08-21 DIAGNOSIS — K43.9 ABDOMINAL WALL HERNIA: ICD-10-CM

## 2022-08-21 LAB
ALBUMIN SERPL-MCNC: 3.5 G/DL (ref 3.2–4.6)
ALBUMIN/GLOB SERPL: 0.8 {RATIO} (ref 1.2–3.5)
ALP SERPL-CCNC: 60 U/L (ref 50–130)
ALT SERPL-CCNC: 22 U/L (ref 12–65)
ANION GAP SERPL CALC-SCNC: 5 MMOL/L (ref 7–16)
APTT PPP: 26.2 SEC (ref 24.1–35.1)
AST SERPL-CCNC: 18 U/L (ref 15–37)
BASOPHILS # BLD: 0.1 K/UL (ref 0–0.2)
BASOPHILS NFR BLD: 1 % (ref 0–2)
BILIRUB SERPL-MCNC: 0.4 MG/DL (ref 0.2–1.1)
BUN SERPL-MCNC: 14 MG/DL (ref 8–23)
CALCIUM SERPL-MCNC: 9.4 MG/DL (ref 8.3–10.4)
CHLORIDE SERPL-SCNC: 107 MMOL/L (ref 98–107)
CO2 SERPL-SCNC: 25 MMOL/L (ref 21–32)
CREAT SERPL-MCNC: 0.64 MG/DL (ref 0.6–1)
DIFFERENTIAL METHOD BLD: ABNORMAL
EOSINOPHIL # BLD: 0 K/UL (ref 0–0.8)
EOSINOPHIL NFR BLD: 1 % (ref 0.5–7.8)
ERYTHROCYTE [DISTWIDTH] IN BLOOD BY AUTOMATED COUNT: 14.7 % (ref 11.9–14.6)
GLOBULIN SER CALC-MCNC: 4.4 G/DL (ref 2.3–3.5)
GLUCOSE SERPL-MCNC: 135 MG/DL (ref 65–100)
HCT VFR BLD AUTO: 38.9 % (ref 35.8–46.3)
HGB BLD-MCNC: 12.4 G/DL (ref 11.7–15.4)
IMM GRANULOCYTES # BLD AUTO: 0 K/UL (ref 0–0.5)
IMM GRANULOCYTES NFR BLD AUTO: 0 % (ref 0–5)
INR PPP: 1.7
LIPASE SERPL-CCNC: 175 U/L (ref 73–393)
LYMPHOCYTES # BLD: 1.2 K/UL (ref 0.5–4.6)
LYMPHOCYTES NFR BLD: 16 % (ref 13–44)
MCH RBC QN AUTO: 26.9 PG (ref 26.1–32.9)
MCHC RBC AUTO-ENTMCNC: 31.9 G/DL (ref 31.4–35)
MCV RBC AUTO: 84.4 FL (ref 79.6–97.8)
MONOCYTES # BLD: 0.3 K/UL (ref 0.1–1.3)
MONOCYTES NFR BLD: 3 % (ref 4–12)
NEUTS SEG # BLD: 6 K/UL (ref 1.7–8.2)
NEUTS SEG NFR BLD: 80 % (ref 43–78)
NRBC # BLD: 0 K/UL (ref 0–0.2)
PLATELET # BLD AUTO: 296 K/UL (ref 150–450)
PMV BLD AUTO: 8.6 FL (ref 9.4–12.3)
POTASSIUM SERPL-SCNC: 3.8 MMOL/L (ref 3.5–5.1)
PROT SERPL-MCNC: 7.9 G/DL (ref 6.3–8.2)
PROTHROMBIN TIME: 20.8 SEC (ref 12.6–14.5)
RBC # BLD AUTO: 4.61 M/UL (ref 4.05–5.2)
SODIUM SERPL-SCNC: 137 MMOL/L (ref 136–145)
WBC # BLD AUTO: 7.5 K/UL (ref 4.3–11.1)

## 2022-08-21 PROCEDURE — 6360000002 HC RX W HCPCS: Performed by: EMERGENCY MEDICINE

## 2022-08-21 PROCEDURE — 3700000000 HC ANESTHESIA ATTENDED CARE: Performed by: SURGERY

## 2022-08-21 PROCEDURE — 85730 THROMBOPLASTIN TIME PARTIAL: CPT

## 2022-08-21 PROCEDURE — 93005 ELECTROCARDIOGRAM TRACING: CPT | Performed by: EMERGENCY MEDICINE

## 2022-08-21 PROCEDURE — 2500000003 HC RX 250 WO HCPCS: Performed by: NURSE ANESTHETIST, CERTIFIED REGISTERED

## 2022-08-21 PROCEDURE — 7100000001 HC PACU RECOVERY - ADDTL 15 MIN: Performed by: SURGERY

## 2022-08-21 PROCEDURE — 99285 EMERGENCY DEPT VISIT HI MDM: CPT

## 2022-08-21 PROCEDURE — 2580000003 HC RX 258: Performed by: EMERGENCY MEDICINE

## 2022-08-21 PROCEDURE — 85610 PROTHROMBIN TIME: CPT

## 2022-08-21 PROCEDURE — 96374 THER/PROPH/DIAG INJ IV PUSH: CPT

## 2022-08-21 PROCEDURE — 85025 COMPLETE CBC W/AUTO DIFF WBC: CPT

## 2022-08-21 PROCEDURE — 7100000000 HC PACU RECOVERY - FIRST 15 MIN: Performed by: SURGERY

## 2022-08-21 PROCEDURE — 3600000014 HC SURGERY LEVEL 4 ADDTL 15MIN: Performed by: SURGERY

## 2022-08-21 PROCEDURE — 3700000001 HC ADD 15 MINUTES (ANESTHESIA): Performed by: SURGERY

## 2022-08-21 PROCEDURE — 2709999900 HC NON-CHARGEABLE SUPPLY: Performed by: SURGERY

## 2022-08-21 PROCEDURE — 74177 CT ABD & PELVIS W/CONTRAST: CPT

## 2022-08-21 PROCEDURE — 80053 COMPREHEN METABOLIC PANEL: CPT

## 2022-08-21 PROCEDURE — 96361 HYDRATE IV INFUSION ADD-ON: CPT

## 2022-08-21 PROCEDURE — 2580000003 HC RX 258: Performed by: NURSE ANESTHETIST, CERTIFIED REGISTERED

## 2022-08-21 PROCEDURE — 83690 ASSAY OF LIPASE: CPT

## 2022-08-21 PROCEDURE — 3600000004 HC SURGERY LEVEL 4 BASE: Performed by: SURGERY

## 2022-08-21 PROCEDURE — 6360000002 HC RX W HCPCS: Performed by: NURSE ANESTHETIST, CERTIFIED REGISTERED

## 2022-08-21 PROCEDURE — 6360000004 HC RX CONTRAST MEDICATION: Performed by: EMERGENCY MEDICINE

## 2022-08-21 PROCEDURE — 71045 X-RAY EXAM CHEST 1 VIEW: CPT

## 2022-08-21 RX ORDER — SIMVASTATIN 10 MG
TABLET ORAL
COMMUNITY
Start: 2022-03-08

## 2022-08-21 RX ORDER — DEXAMETHASONE SODIUM PHOSPHATE 10 MG/ML
INJECTION INTRAMUSCULAR; INTRAVENOUS PRN
Status: DISCONTINUED | OUTPATIENT
Start: 2022-08-21 | End: 2022-08-22 | Stop reason: SDUPTHER

## 2022-08-21 RX ORDER — ROCURONIUM BROMIDE 10 MG/ML
INJECTION, SOLUTION INTRAVENOUS PRN
Status: DISCONTINUED | OUTPATIENT
Start: 2022-08-21 | End: 2022-08-22 | Stop reason: SDUPTHER

## 2022-08-21 RX ORDER — SUCCINYLCHOLINE CHLORIDE 20 MG/ML
INJECTION INTRAMUSCULAR; INTRAVENOUS PRN
Status: DISCONTINUED | OUTPATIENT
Start: 2022-08-21 | End: 2022-08-22 | Stop reason: SDUPTHER

## 2022-08-21 RX ORDER — 0.9 % SODIUM CHLORIDE 0.9 %
100 INTRAVENOUS SOLUTION INTRAVENOUS ONCE
Status: COMPLETED | OUTPATIENT
Start: 2022-08-21 | End: 2022-08-21

## 2022-08-21 RX ORDER — DIPHENHYDRAMINE HYDROCHLORIDE 50 MG/ML
12.5 INJECTION INTRAMUSCULAR; INTRAVENOUS
Status: ACTIVE | OUTPATIENT
Start: 2022-08-21 | End: 2022-08-21

## 2022-08-21 RX ORDER — FENTANYL CITRATE 50 UG/ML
INJECTION, SOLUTION INTRAMUSCULAR; INTRAVENOUS PRN
Status: DISCONTINUED | OUTPATIENT
Start: 2022-08-21 | End: 2022-08-22 | Stop reason: SDUPTHER

## 2022-08-21 RX ORDER — PROCHLORPERAZINE EDISYLATE 5 MG/ML
5 INJECTION INTRAMUSCULAR; INTRAVENOUS
Status: ACTIVE | OUTPATIENT
Start: 2022-08-21 | End: 2022-08-21

## 2022-08-21 RX ORDER — LIDOCAINE HYDROCHLORIDE 20 MG/ML
INJECTION, SOLUTION EPIDURAL; INFILTRATION; INTRACAUDAL; PERINEURAL PRN
Status: DISCONTINUED | OUTPATIENT
Start: 2022-08-21 | End: 2022-08-22 | Stop reason: SDUPTHER

## 2022-08-21 RX ORDER — SODIUM CHLORIDE, SODIUM LACTATE, POTASSIUM CHLORIDE, CALCIUM CHLORIDE 600; 310; 30; 20 MG/100ML; MG/100ML; MG/100ML; MG/100ML
INJECTION, SOLUTION INTRAVENOUS CONTINUOUS PRN
Status: DISCONTINUED | OUTPATIENT
Start: 2022-08-21 | End: 2022-08-22 | Stop reason: SDUPTHER

## 2022-08-21 RX ORDER — SODIUM CHLORIDE 0.9 % (FLUSH) 0.9 %
5-40 SYRINGE (ML) INJECTION PRN
Status: DISCONTINUED | OUTPATIENT
Start: 2022-08-21 | End: 2022-08-22

## 2022-08-21 RX ORDER — PROPOFOL 10 MG/ML
INJECTION, EMULSION INTRAVENOUS PRN
Status: DISCONTINUED | OUTPATIENT
Start: 2022-08-21 | End: 2022-08-22 | Stop reason: SDUPTHER

## 2022-08-21 RX ORDER — OXYCODONE HYDROCHLORIDE 5 MG/1
5 TABLET ORAL
Status: ACTIVE | OUTPATIENT
Start: 2022-08-21 | End: 2022-08-21

## 2022-08-21 RX ORDER — SODIUM CHLORIDE 0.9 % (FLUSH) 0.9 %
5-40 SYRINGE (ML) INJECTION EVERY 12 HOURS SCHEDULED
Status: DISCONTINUED | OUTPATIENT
Start: 2022-08-22 | End: 2022-08-22 | Stop reason: HOSPADM

## 2022-08-21 RX ORDER — HYDRALAZINE HYDROCHLORIDE 20 MG/ML
10 INJECTION INTRAMUSCULAR; INTRAVENOUS
Status: DISCONTINUED | OUTPATIENT
Start: 2022-08-21 | End: 2022-08-22

## 2022-08-21 RX ORDER — LABETALOL HYDROCHLORIDE 5 MG/ML
10 INJECTION, SOLUTION INTRAVENOUS
Status: DISCONTINUED | OUTPATIENT
Start: 2022-08-21 | End: 2022-08-22

## 2022-08-21 RX ORDER — HYDROMORPHONE HYDROCHLORIDE 1 MG/ML
0.25 INJECTION, SOLUTION INTRAMUSCULAR; INTRAVENOUS; SUBCUTANEOUS EVERY 5 MIN PRN
Status: DISCONTINUED | OUTPATIENT
Start: 2022-08-21 | End: 2022-08-22

## 2022-08-21 RX ORDER — HALOPERIDOL 5 MG/ML
1 INJECTION INTRAMUSCULAR
Status: ACTIVE | OUTPATIENT
Start: 2022-08-21 | End: 2022-08-21

## 2022-08-21 RX ORDER — ONDANSETRON 2 MG/ML
4 INJECTION INTRAMUSCULAR; INTRAVENOUS ONCE
Status: COMPLETED | OUTPATIENT
Start: 2022-08-21 | End: 2022-08-21

## 2022-08-21 RX ORDER — 0.9 % SODIUM CHLORIDE 0.9 %
1000 INTRAVENOUS SOLUTION INTRAVENOUS ONCE
Status: COMPLETED | OUTPATIENT
Start: 2022-08-21 | End: 2022-08-21

## 2022-08-21 RX ORDER — SODIUM CHLORIDE 9 MG/ML
INJECTION, SOLUTION INTRAVENOUS PRN
Status: DISCONTINUED | OUTPATIENT
Start: 2022-08-21 | End: 2022-08-22 | Stop reason: HOSPADM

## 2022-08-21 RX ORDER — ONDANSETRON 2 MG/ML
INJECTION INTRAMUSCULAR; INTRAVENOUS PRN
Status: DISCONTINUED | OUTPATIENT
Start: 2022-08-21 | End: 2022-08-22

## 2022-08-21 RX ORDER — HYDROMORPHONE HYDROCHLORIDE 1 MG/ML
0.5 INJECTION, SOLUTION INTRAMUSCULAR; INTRAVENOUS; SUBCUTANEOUS EVERY 5 MIN PRN
Status: DISCONTINUED | OUTPATIENT
Start: 2022-08-21 | End: 2022-08-22

## 2022-08-21 RX ADMIN — SODIUM CHLORIDE 100 ML: 9 INJECTION, SOLUTION INTRAVENOUS at 20:41

## 2022-08-21 RX ADMIN — SODIUM CHLORIDE, SODIUM LACTATE, POTASSIUM CHLORIDE, AND CALCIUM CHLORIDE: 600; 310; 30; 20 INJECTION, SOLUTION INTRAVENOUS at 23:28

## 2022-08-21 RX ADMIN — FENTANYL CITRATE 100 MCG: 50 INJECTION, SOLUTION INTRAMUSCULAR; INTRAVENOUS at 23:31

## 2022-08-21 RX ADMIN — IOPAMIDOL 100 ML: 755 INJECTION, SOLUTION INTRAVENOUS at 20:41

## 2022-08-21 RX ADMIN — SODIUM CHLORIDE 1000 ML: 9 INJECTION, SOLUTION INTRAVENOUS at 19:54

## 2022-08-21 RX ADMIN — ONDANSETRON 4 MG: 2 INJECTION INTRAMUSCULAR; INTRAVENOUS at 23:41

## 2022-08-21 RX ADMIN — DEXAMETHASONE SODIUM PHOSPHATE 10 MG: 10 INJECTION INTRAMUSCULAR; INTRAVENOUS at 23:41

## 2022-08-21 RX ADMIN — CEFAZOLIN 2000 MG: 1 INJECTION, POWDER, FOR SOLUTION INTRAMUSCULAR; INTRAVENOUS at 23:34

## 2022-08-21 RX ADMIN — ONDANSETRON 4 MG: 2 INJECTION INTRAMUSCULAR; INTRAVENOUS at 19:54

## 2022-08-21 RX ADMIN — ROCURONIUM BROMIDE 30 MG: 50 INJECTION, SOLUTION INTRAVENOUS at 23:41

## 2022-08-21 RX ADMIN — Medication 140 MG: at 23:33

## 2022-08-21 RX ADMIN — LIDOCAINE HYDROCHLORIDE 100 MG: 20 INJECTION, SOLUTION EPIDURAL; INFILTRATION; INTRACAUDAL; PERINEURAL at 23:33

## 2022-08-21 RX ADMIN — PROPOFOL 160 MG: 10 INJECTION, EMULSION INTRAVENOUS at 23:33

## 2022-08-21 ASSESSMENT — PAIN SCALES - GENERAL: PAINLEVEL_OUTOF10: 8

## 2022-08-21 ASSESSMENT — PAIN DESCRIPTION - DESCRIPTORS: DESCRIPTORS: ACHING;THROBBING

## 2022-08-21 ASSESSMENT — PAIN - FUNCTIONAL ASSESSMENT: PAIN_FUNCTIONAL_ASSESSMENT: 0-10

## 2022-08-21 ASSESSMENT — PAIN DESCRIPTION - LOCATION: LOCATION: ABDOMEN

## 2022-08-21 ASSESSMENT — ENCOUNTER SYMPTOMS: ABDOMINAL PAIN: 1

## 2022-08-21 NOTE — ED PROVIDER NOTES
hours  Timing:  Intermittent  Progression:  Waxing and waning  Chronicity:  New  Context: not diet changes, not eating, not recent illness, not retching and not sick contacts        Review of Systems   Gastrointestinal:  Positive for abdominal pain. All other systems reviewed and are negative. Past Medical History:   Diagnosis Date    Allergic rhinitis     mild, seasonal    Diabetes mellitus type 2, controlled ([facility] [address] 2005    Hypertension 2005    Iron deficiency anemia     Postmenopausal         Past Surgical History:   Procedure Laterality Date    HX OPEN REDUCTION INTERNAL FIXATION  1995    right femur,         Family History   Problem Relation Age of Onset    Breast Cancer Maternal Aunt 36    Liver Disease Father         alcoholic cirrhosis    Breast Cancer Maternal Aunt 36    Osteoarthritis Mother         Social History     Socioeconomic History    Marital status:    Tobacco Use    Smoking status: Never    Smokeless tobacco: Never   Substance and Sexual Activity    Alcohol use: No     Alcohol/week: 0.0 standard drinks    Drug use: No   Social History Narrative    [place], in [employer] 32yo    works [employer], Tech specialist at [employer]     Previous Medications    AMLODIPINE-OLMESARTAN (JONAS) 5-40 MG PER TABLET    1 tab po once daily for blood pressure    ASPIRIN 81 MG CHEWABLE TABLET    Take by mouth    CALCIUM PO    Take by mouth    DULAGLUTIDE 1.5 MG/0.5ML SOPN    Inject 1.5 mg into the skin every 7 days    FLAXSEED, LINSEED, (FLAXSEED OIL PO)    Take by mouth    KAREN, ZINGIBER OFFICINALIS, (KAREN ROOT PO)    Take by mouth    METFORMIN (GLUCOPHAGE) 1000 MG TABLET    Take 1,000 mg by mouth daily    SIMVASTATIN (ZOCOR) 10 MG TABLET        VITAMIN E 1000 UNITS CAPSULE    Take by mouth        Vitals signs and nursing note reviewed.    Patient Vitals for the past 4 hrs:   Temp Pulse Resp BP SpO2   08/21/22 1758 98.2 °F (36.8 °C) 96 16 127/73 98 %          Physical Exam  Vitals and nursing note reviewed. Constitutional:       Appearance: Normal appearance. HENT:      Head: Normocephalic and atraumatic. Nose: Nose normal.      Mouth/Throat:      Mouth: Mucous membranes are dry. Pharynx: Oropharynx is clear. Eyes:      Extraocular Movements: Extraocular movements intact. Conjunctiva/sclera: Conjunctivae normal.      Pupils: Pupils are equal, round, and reactive to light. Cardiovascular:      Rate and Rhythm: Normal rate. Pulses: Normal pulses. Pulmonary:      Effort: Pulmonary effort is normal.   Abdominal:      General: Abdomen is flat. Bowel sounds are normal.      Palpations: Abdomen is soft. Tenderness: generalized abdominal tenderness   Musculoskeletal:         General: Normal range of motion. Cervical back: Normal range of motion and neck supple. Skin:     General: Skin is warm and dry. Capillary Refill: Capillary refill takes less than 2 seconds. Neurological:      General: No focal deficit present. Mental Status: She is alert and oriented to person, place, and time. Mental status is at baseline. Psychiatric:         Mood and Affect: Mood normal.         Behavior: Behavior normal.         Thought Content:  Thought content normal.         Judgment: Judgment normal.        Procedures      Labs Reviewed   CBC WITH AUTO DIFFERENTIAL - Abnormal; Notable for the following components:       Result Value    RDW 14.7 (*)     MPV 8.6 (*)     Seg Neutrophils 80 (*)     Monocytes 3 (*)     All other components within normal limits   COMPREHENSIVE METABOLIC PANEL - Abnormal; Notable for the following components:    Anion Gap 5 (*)     Glucose 135 (*)     Globulin 4.4 (*)     Albumin/Globulin Ratio 0.8 (*)     All other components within normal limits   LIPASE        CT ABDOMEN PELVIS W IV CONTRAST Additional Contrast? None    (Results Pending)                    ED Course as of 08/23/22 8605   Sun Aug 21, 2022   2152 CANDY Phillip Brar has been contacted via [facility] and we are awaiting a response. []   0287 EKG interpretation: Normal sinus rhythm, rate of 88, normal intervals, no ST or T wave abnormality []   7651 Dr. Hieu Ivey coming in to take her OR tonight per NP Pino Martinez   []      ED Course User Index  [] 9460 JUNI Cortes MD        Voice dictation software was used during the making of this note. This software is not perfect and grammatical and other typographical errors may be present. This note has not been completely proofread for errors.        [address] Cortes, [address] [place] [address]  68/02/12 2348

## 2022-08-22 PROBLEM — K56.609 SBO (SMALL BOWEL OBSTRUCTION) (HCC): Status: ACTIVE | Noted: 2019-11-17

## 2022-08-22 PROBLEM — Z87.19 H/O UMBILICAL HERNIA REPAIR: Status: ACTIVE | Noted: 2022-08-22

## 2022-08-22 PROBLEM — K43.0 INCARCERATED INCISIONAL HERNIA: Status: ACTIVE | Noted: 2022-08-22

## 2022-08-22 PROBLEM — Z98.890 H/O UMBILICAL HERNIA REPAIR: Status: ACTIVE | Noted: 2022-08-22

## 2022-08-22 PROBLEM — G47.33 OSA (OBSTRUCTIVE SLEEP APNEA): Status: ACTIVE | Noted: 2018-04-19

## 2022-08-22 PROBLEM — K42.0 INCARCERATED UMBILICAL HERNIA: Status: ACTIVE | Noted: 2022-08-22

## 2022-08-22 PROBLEM — E66.01 OBESITY, MORBID (HCC): Status: ACTIVE | Noted: 2018-02-20

## 2022-08-22 LAB
EKG ATRIAL RATE: 88 BPM
EKG DIAGNOSIS: NORMAL
EKG P AXIS: 72 DEGREES
EKG P-R INTERVAL: 174 MS
EKG Q-T INTERVAL: 378 MS
EKG QRS DURATION: 70 MS
EKG QTC CALCULATION (BAZETT): 457 MS
EKG R AXIS: 44 DEGREES
EKG T AXIS: 43 DEGREES
EKG VENTRICULAR RATE: 88 BPM

## 2022-08-22 PROCEDURE — 2500000003 HC RX 250 WO HCPCS: Performed by: NURSE PRACTITIONER

## 2022-08-22 PROCEDURE — 6360000002 HC RX W HCPCS

## 2022-08-22 PROCEDURE — 2580000003 HC RX 258: Performed by: NURSE PRACTITIONER

## 2022-08-22 PROCEDURE — 6360000002 HC RX W HCPCS: Performed by: NURSE PRACTITIONER

## 2022-08-22 PROCEDURE — 6360000002 HC RX W HCPCS: Performed by: NURSE ANESTHETIST, CERTIFIED REGISTERED

## 2022-08-22 PROCEDURE — 2500000003 HC RX 250 WO HCPCS: Performed by: SURGERY

## 2022-08-22 PROCEDURE — 1100000000 HC RM PRIVATE

## 2022-08-22 PROCEDURE — A4216 STERILE WATER/SALINE, 10 ML: HCPCS | Performed by: NURSE PRACTITIONER

## 2022-08-22 PROCEDURE — 0WQF0ZZ REPAIR ABDOMINAL WALL, OPEN APPROACH: ICD-10-PCS | Performed by: SURGERY

## 2022-08-22 PROCEDURE — 2500000003 HC RX 250 WO HCPCS: Performed by: NURSE ANESTHETIST, CERTIFIED REGISTERED

## 2022-08-22 RX ORDER — HYDROMORPHONE HYDROCHLORIDE 1 MG/ML
1 INJECTION, SOLUTION INTRAMUSCULAR; INTRAVENOUS; SUBCUTANEOUS EVERY 4 HOURS PRN
Status: DISCONTINUED | OUTPATIENT
Start: 2022-08-22 | End: 2022-08-25 | Stop reason: HOSPADM

## 2022-08-22 RX ORDER — ONDANSETRON 4 MG/1
4 TABLET, ORALLY DISINTEGRATING ORAL EVERY 8 HOURS PRN
Status: DISCONTINUED | OUTPATIENT
Start: 2022-08-22 | End: 2022-08-25 | Stop reason: HOSPADM

## 2022-08-22 RX ORDER — GLYCOPYRROLATE 0.2 MG/ML
INJECTION INTRAMUSCULAR; INTRAVENOUS PRN
Status: DISCONTINUED | OUTPATIENT
Start: 2022-08-22 | End: 2022-08-22 | Stop reason: SDUPTHER

## 2022-08-22 RX ORDER — OXYCODONE HYDROCHLORIDE 5 MG/1
5 TABLET ORAL EVERY 4 HOURS PRN
Status: DISCONTINUED | OUTPATIENT
Start: 2022-08-22 | End: 2022-08-25 | Stop reason: HOSPADM

## 2022-08-22 RX ORDER — SODIUM CHLORIDE 9 MG/ML
INJECTION, SOLUTION INTRAVENOUS PRN
Status: DISCONTINUED | OUTPATIENT
Start: 2022-08-22 | End: 2022-08-25 | Stop reason: HOSPADM

## 2022-08-22 RX ORDER — ACETAMINOPHEN 650 MG/1
650 SUPPOSITORY RECTAL EVERY 6 HOURS PRN
Status: DISCONTINUED | OUTPATIENT
Start: 2022-08-22 | End: 2022-08-25 | Stop reason: HOSPADM

## 2022-08-22 RX ORDER — AMLODIPINE AND OLMESARTAN MEDOXOMIL 5; 40 MG/1; MG/1
1 TABLET ORAL DAILY
Status: CANCELLED | OUTPATIENT
Start: 2022-08-22

## 2022-08-22 RX ORDER — ENOXAPARIN SODIUM 100 MG/ML
40 INJECTION SUBCUTANEOUS DAILY
Status: DISCONTINUED | OUTPATIENT
Start: 2022-08-22 | End: 2022-08-25 | Stop reason: HOSPADM

## 2022-08-22 RX ORDER — CEFAZOLIN SODIUM 1 G/3ML
INJECTION, POWDER, FOR SOLUTION INTRAMUSCULAR; INTRAVENOUS PRN
Status: DISCONTINUED | OUTPATIENT
Start: 2022-08-21 | End: 2022-08-22 | Stop reason: SDUPTHER

## 2022-08-22 RX ORDER — BUPIVACAINE HYDROCHLORIDE 5 MG/ML
INJECTION, SOLUTION EPIDURAL; INTRACAUDAL PRN
Status: DISCONTINUED | OUTPATIENT
Start: 2022-08-22 | End: 2022-08-22 | Stop reason: ALTCHOICE

## 2022-08-22 RX ORDER — NEOSTIGMINE METHYLSULFATE 1 MG/ML
INJECTION, SOLUTION INTRAVENOUS PRN
Status: DISCONTINUED | OUTPATIENT
Start: 2022-08-22 | End: 2022-08-22 | Stop reason: SDUPTHER

## 2022-08-22 RX ORDER — ACETAMINOPHEN 325 MG/1
650 TABLET ORAL EVERY 6 HOURS PRN
Status: DISCONTINUED | OUTPATIENT
Start: 2022-08-22 | End: 2022-08-25 | Stop reason: HOSPADM

## 2022-08-22 RX ORDER — KETOROLAC TROMETHAMINE 30 MG/ML
15 INJECTION, SOLUTION INTRAMUSCULAR; INTRAVENOUS EVERY 6 HOURS PRN
Status: ACTIVE | OUTPATIENT
Start: 2022-08-22 | End: 2022-08-24

## 2022-08-22 RX ORDER — INSULIN LISPRO 100 [IU]/ML
0-8 INJECTION, SOLUTION INTRAVENOUS; SUBCUTANEOUS
Status: CANCELLED | OUTPATIENT
Start: 2022-08-22

## 2022-08-22 RX ORDER — SODIUM CHLORIDE 9 MG/ML
INJECTION, SOLUTION INTRAVENOUS CONTINUOUS
Status: DISCONTINUED | OUTPATIENT
Start: 2022-08-22 | End: 2022-08-24

## 2022-08-22 RX ORDER — SODIUM CHLORIDE 0.9 % (FLUSH) 0.9 %
5-40 SYRINGE (ML) INJECTION PRN
Status: DISCONTINUED | OUTPATIENT
Start: 2022-08-22 | End: 2022-08-25 | Stop reason: HOSPADM

## 2022-08-22 RX ORDER — INSULIN LISPRO 100 [IU]/ML
0-4 INJECTION, SOLUTION INTRAVENOUS; SUBCUTANEOUS NIGHTLY
Status: CANCELLED | OUTPATIENT
Start: 2022-08-22

## 2022-08-22 RX ORDER — HYDROMORPHONE HYDROCHLORIDE 2 MG/ML
1 INJECTION, SOLUTION INTRAMUSCULAR; INTRAVENOUS; SUBCUTANEOUS EVERY 4 HOURS PRN
Status: DISCONTINUED | OUTPATIENT
Start: 2022-08-22 | End: 2022-08-22 | Stop reason: SDUPTHER

## 2022-08-22 RX ORDER — ONDANSETRON 2 MG/ML
4 INJECTION INTRAMUSCULAR; INTRAVENOUS EVERY 6 HOURS PRN
Status: DISCONTINUED | OUTPATIENT
Start: 2022-08-22 | End: 2022-08-25 | Stop reason: HOSPADM

## 2022-08-22 RX ORDER — SODIUM CHLORIDE 0.9 % (FLUSH) 0.9 %
5-40 SYRINGE (ML) INJECTION EVERY 12 HOURS SCHEDULED
Status: DISCONTINUED | OUTPATIENT
Start: 2022-08-22 | End: 2022-08-25 | Stop reason: HOSPADM

## 2022-08-22 RX ORDER — KETOROLAC TROMETHAMINE 30 MG/ML
INJECTION, SOLUTION INTRAMUSCULAR; INTRAVENOUS PRN
Status: DISCONTINUED | OUTPATIENT
Start: 2022-08-22 | End: 2022-08-22

## 2022-08-22 RX ORDER — DIMETHICONE, CAMPHOR (SYNTHETIC), MENTHOL, AND PHENOL 1.1; .5; .625; .5 G/100G; G/100G; G/100G; G/100G
OINTMENT TOPICAL PRN
Status: DISCONTINUED | OUTPATIENT
Start: 2022-08-22 | End: 2022-08-25 | Stop reason: HOSPADM

## 2022-08-22 RX ADMIN — ENOXAPARIN SODIUM 40 MG: 100 INJECTION SUBCUTANEOUS at 15:04

## 2022-08-22 RX ADMIN — SODIUM CHLORIDE, PRESERVATIVE FREE 10 ML: 5 INJECTION INTRAVENOUS at 08:33

## 2022-08-22 RX ADMIN — HYDROMORPHONE HYDROCHLORIDE 1 MG: 1 INJECTION, SOLUTION INTRAMUSCULAR; INTRAVENOUS; SUBCUTANEOUS at 02:02

## 2022-08-22 RX ADMIN — HYDROMORPHONE HYDROCHLORIDE 1 MG: 2 INJECTION, SOLUTION INTRAMUSCULAR; INTRAVENOUS; SUBCUTANEOUS at 02:08

## 2022-08-22 RX ADMIN — SODIUM CHLORIDE, PRESERVATIVE FREE 20 MG: 5 INJECTION INTRAVENOUS at 08:33

## 2022-08-22 RX ADMIN — SODIUM CHLORIDE, PRESERVATIVE FREE 10 ML: 5 INJECTION INTRAVENOUS at 21:33

## 2022-08-22 RX ADMIN — SODIUM CHLORIDE: 900 INJECTION, SOLUTION INTRAVENOUS at 02:02

## 2022-08-22 RX ADMIN — SODIUM CHLORIDE, PRESERVATIVE FREE 20 MG: 5 INJECTION INTRAVENOUS at 21:25

## 2022-08-22 RX ADMIN — NEOSTIGMINE METHYLSULFATE 3 MG: 1 INJECTION, SOLUTION INTRAVENOUS at 00:35

## 2022-08-22 RX ADMIN — GLYCOPYRROLATE 0.4 MG: 0.2 INJECTION, SOLUTION INTRAMUSCULAR; INTRAVENOUS at 00:35

## 2022-08-22 RX ADMIN — KETOROLAC TROMETHAMINE 30 MG: 30 INJECTION, SOLUTION INTRAMUSCULAR; INTRAVENOUS at 00:34

## 2022-08-22 RX ADMIN — SODIUM CHLORIDE: 900 INJECTION, SOLUTION INTRAVENOUS at 15:05

## 2022-08-22 ASSESSMENT — PAIN SCALES - GENERAL
PAINLEVEL_OUTOF10: 5
PAINLEVEL_OUTOF10: 8

## 2022-08-22 ASSESSMENT — PAIN DESCRIPTION - LOCATION
LOCATION: ABDOMEN
LOCATION: ABDOMEN

## 2022-08-22 NOTE — PROGRESS NOTES
Patient ambulated in hallway 3 times this shift, pt tolerated well. Pt up in recliner all shift, tolerating well. NPO with ice chips and tolerating. No flatus or BM today, but does report belching. All needs assessed at this time.

## 2022-08-22 NOTE — ANESTHESIA POSTPROCEDURE EVALUATION
Department of Anesthesiology  Postprocedure Note    Patient: Glenn Bee  MRN: [id number]  YOB: 1960  Date of evaluation: 8/22/2022      Procedure Summary     Date: 08/21/22 Room / Location: [facility] MAIN OR 01 / [facility] OR; [facility] RADIOLOGY    Anesthesia Start: 2328 Anesthesia Stop: 08/22/22 0053    Procedures:       XR CHEST PORTABLE      HERNIA UMBILICAL REPAIR (Abdomen) Diagnosis:       Hernia, umbilical, with obstruction      (admission)      (Incarcerated umbilical hernia)    Scheduled Providers: Kapil Hightower MD Responsible Provider: Maile Daniel MD    Anesthesia Type: general ASA Status: 2 - Emergent          Anesthesia Type: No value filed.     Isabel Phase I: Isabel Score: 10    Isabel Phase II:        Anesthesia Post Evaluation    Patient location during evaluation: PACU  Patient participation: complete - patient participated  Level of consciousness: awake and alert  Airway patency: patent  Nausea & Vomiting: no nausea and no vomiting  Complications: no  Cardiovascular status: hemodynamically stable  Respiratory status: acceptable  Hydration status: euvolemic  Multimodal analgesia pain management approach

## 2022-08-22 NOTE — CONSULTS
Hospitalist History and Physical   Admit Date:  2022  7:07 PM   Name:  Flex Gonzalez   Age:  60 y.o. Sex:  female  :  1960   MRN:  [id number]   Room:  360/01    Presenting Complaint: Abdominal Pain, Emesis, and Diarrhea     Reason(s) for Admission: SBO (small bowel obstruction) ([facility] [address] [K56.609]  Abdominal wall hernia [K43.9]  Incarcerated umbilical hernia [S75.6]     History of Present Illness:   Flex Gonzalez is a 60 y.o. female with medical history of umbilical repair in , morbid obesity, HTN, ELIZABET, DM2 who presented with 12 hours of abdominal pain, nausea and vomiting. She has been admitted by general surgery for surgical repair of incarcerated umbilical hernia. Review of Systems:  10 systems reviewed and negative except as noted in HPI. Assessment & Plan:     # Incarcerated umbilical hernia  - mgmt per primary,    # DM2  - holding oral meds  - SSI  - titrate as necessary  - check A1C    # HLP  - statin    # MO BMI 32  - adding to medical complexity          Dispo/Discharge Planning:     Per primary    Diet: Diet NPO  VTE ppx: per primary  Code status: Full Code    Hospital Problems:  Principal Problem:    Incarcerated incisional hernia  Active Problems:    H/O umbilical hernia repair    Obesity, morbid ([facility] [address]  Resolved Problems:    * No resolved hospital problems.  *       Past History:     Past Medical History:   Diagnosis Date    Allergic rhinitis     mild, seasonal    Diabetes mellitus type 2, controlled ([facility] [address]     Hypertension     Iron deficiency anemia     Postmenopausal        Past Surgical History:   Procedure Laterality Date    HX OPEN REDUCTION INTERNAL FIXATION      right femur,     UMBILICAL HERNIA REPAIR N/A     HERNIA UMBILICAL REPAIR performed by Jyotsna Varner MD at [facility]        Social History     Tobacco Use    Smoking status: Never    Smokeless tobacco: Never   Substance Use Topics    Alcohol use: No     Alcohol/week: 0.0 standard drinks      Social History     Substance and Sexual Activity   Drug Use No       Family History   Problem Relation Age of Onset    Breast Cancer Maternal Aunt 36    Liver Disease Father         alcoholic cirrhosis    Breast Cancer Maternal Aunt 36    Osteoarthritis Mother         Immunization History   Administered Date(s) Administered    Influenza Virus Vaccine 10/10/2018    Influenza, FLUARIX, FLULAVAL, (age 10 mo+) AND AFLURIA, FLUZONE (age 1 y+), PF 12/07/2015    Pneumococcal Polysaccharide ([id number]) 02/20/2018     Allergies   Allergen Reactions    Penicillins Itching and Swelling     Prior to Admit Medications:  Current Outpatient Medications   Medication Instructions    amLODIPine-olmesartan (JONAS) 5-40 MG per tablet 1 tab po once daily for blood pressure    aspirin 81 MG chewable tablet Take by mouth    CALCIUM PO Oral    Dulaglutide 1.5 mg, SubCUTAneous, EVERY 7 DAYS    Flaxseed, Linseed, (FLAXSEED OIL PO) Oral    Tessie, Zingiber officinalis, (TESSIE ROOT PO) Oral    metFORMIN (GLUCOPHAGE) 1,000 mg, Oral, DAILY    simvastatin (ZOCOR) 10 MG tablet No dose, route, or frequency recorded.     vitamin E 1000 units capsule Oral         Objective:   Patient Vitals for the past 24 hrs:   Temp Pulse Resp BP SpO2   08/22/22 1551 98.1 °F (36.7 °C) 64 16 114/64 96 %   08/22/22 1053 97.7 °F (36.5 °C) 73 18 130/72 93 %   08/22/22 0711 97.7 °F (36.5 °C) 75 16 112/64 93 %   08/22/22 0224 97.9 °F (36.6 °C) 72 16 116/64 95 %   08/22/22 0120 -- 72 16 118/60 93 %   08/22/22 0115 -- 73 16 (!) 119/59 92 %   08/22/22 0110 -- 72 16 (!) 118/55 93 %   08/22/22 0105 -- 71 16 (!) 126/58 94 %   08/22/22 0100 -- 77 16 (!) 117/58 94 %   08/22/22 0057 -- 75 -- -- --   08/22/22 0055 -- 83 16 127/63 95 %   08/22/22 0054 -- 85 -- -- --   08/22/22 0053 98.1 °F (36.7 °C) 83 16 127/63 95 %   08/22/22 0050 99.1 °F (37.3 °C) 85 16 129/62 95 %   08/21/22 2312 -- 86 -- 126/61 96 %   08/21/22 2250 -- 93 -- (!) 123/58 98 %   08/21/22 2240 -- 90 -- -- 97 %   08/21/22 2230 -- 95 19 (!) 140/67 99 %   08/21/22 2000 -- 78 19 124/81 98 %   08/21/22 1758 98.2 °F (36.8 °C) 96 16 127/73 98 %       Oxygen Therapy  SpO2: 96 %  Pulse Oximetry Type: Continuous  Pulse via Oximetry: 73 beats per minute  Pulse Oximeter Device Mode: Continuous  O2 Device: None (Room air)    Estimated body mass index is 32.5 kg/m² as calculated from the following:    Height as of this encounter: 5' 1\" (1.549 m). Weight as of this encounter: 172 lb (78 kg). Intake/Output Summary (Last 24 hours) at 8/22/2022 1721  Last data filed at 8/22/2022 0503  Gross per 24 hour   Intake 1000 ml   Output 350 ml   Net 650 ml         Physical Exam:    Blood pressure 114/64, pulse 64, temperature 98.1 °F (36.7 °C), temperature source Oral, resp. rate 16, height 5' 1\" (1.549 m), weight 172 lb (78 kg), SpO2 96 %. General:    Well nourished. Head:  Normocephalic, atraumatic  Eyes:  Sclerae appear normal.  Pupils equally round. ENT:  Nares appear normal, no drainage. Moist oral mucosa  Neck:  No restricted ROM. Trachea midline   CV:   RRR. No m/r/g. No jugular venous distension. Lungs:   CTAB. No wheezing, rhonchi, or rales. Symmetric expansion. Abdomen: Bowel sounds present. Soft, nontender, nondistended. Incision c/d/i  Extremities: No cyanosis or clubbing. No edema  Skin:     No rashes and normal coloration. Warm and dry. Neuro:  CN II-XII grossly intact. Sensation intact. A&Ox3  Psych:  Normal mood and affect.       I have personally reviewed labs and tests showing:  Recent Labs:  Recent Results (from the past 24 hour(s))   CBC with Diff    Collection Time: 08/21/22  6:14 PM   Result Value Ref Range    WBC 7.5 4.3 - 11.1 K/uL    RBC 4.61 4.05 - 5.2 M/uL    Hemoglobin 12.4 11.7 - 15.4 g/dL    Hematocrit 38.9 35.8 - 46.3 %    MCV 84.4 79.6 - 97.8 FL    MCH 26.9 26.1 - 32.9 PG    MCHC 31.9 31.4 - 35.0 g/dL    RDW 14.7 (H) 11.9 - 14.6 %    Platelets 952 594 - 842 K/uL    MPV 8.6 (L) 9.4 - 12.3 FL    nRBC 0.00 0.0 - 0.2 K/uL    Differential Type AUTOMATED      Seg Neutrophils 80 (H) 43 - 78 %    Lymphocytes 16 13 - 44 %    Monocytes 3 (L) 4.0 - 12.0 %    Eosinophils % 1 0.5 - 7.8 %    Basophils 1 0.0 - 2.0 %    Immature Granulocytes 0 0.0 - 5.0 %    Segs Absolute 6.0 1.7 - 8.2 K/UL    Absolute Lymph # 1.2 0.5 - 4.6 K/UL    Absolute Mono # 0.3 0.1 - 1.3 K/UL    Absolute Eos # 0.0 0.0 - 0.8 K/UL    Basophils Absolute 0.1 0.0 - 0.2 K/UL    Absolute Immature Granulocyte 0.0 0.0 - 0.5 K/UL   CMP    Collection Time: 08/21/22  6:14 PM   Result Value Ref Range    Sodium 137 136 - 145 mmol/L    Potassium 3.8 3.5 - 5.1 mmol/L    Chloride 107 98 - 107 mmol/L    CO2 25 21 - 32 mmol/L    Anion Gap 5 (L) 7 - 16 mmol/L    Glucose 135 (H) 65 - 100 mg/dL    BUN 14 8 - 23 MG/DL    Creatinine 0.64 0.6 - 1.0 MG/DL    GFR African American >60 >60 ml/min/1.73m2    GFR Non- >60 >60 ml/min/1.73m2    Calcium 9.4 8.3 - 10.4 MG/DL    Total Bilirubin 0.4 0.2 - 1.1 MG/DL    ALT 22 12 - 65 U/L    AST 18 15 - 37 U/L    Alk Phosphatase 60 50 - 130 U/L    Total Protein 7.9 6.3 - 8.2 g/dL    Albumin 3.5 3.2 - 4.6 g/dL    Globulin 4.4 (H) 2.3 - 3.5 g/dL    Albumin/Globulin Ratio 0.8 (L) 1.2 - 3.5     Lipase    Collection Time: 08/21/22  6:14 PM   Result Value Ref Range    Lipase 175 73 - 393 U/L   Protime-INR    Collection Time: 08/21/22  9:55 PM   Result Value Ref Range    Protime 20.8 (H) 12.6 - 14.5 sec    INR 1.7     APTT    Collection Time: 08/21/22  9:55 PM   Result Value Ref Range    PTT 26.2 24.1 - 35.1 SEC   EKG 12 Lead    Collection Time: 08/21/22  9:59 PM   Result Value Ref Range    Ventricular Rate 88 BPM    Atrial Rate 88 BPM    P-R Interval 174 ms    QRS Duration 70 ms    Q-T Interval 378 ms    QTc Calculation (Bazett) 457 ms    P Axis 72 degrees    R Axis 44 degrees    T Axis 43 degrees    Diagnosis Normal sinus rhythm        I have personally reviewed imaging studies showing:  CT ABDOMEN PELVIS W IV CONTRAST Additional Contrast? None    Result Date: 8/21/2022  EXAM: CT ABDOMEN PELVIS W IV CONTRAST HISTORY: abd pain; vomiting. TECHNIQUE: Axial images of the abdomen and pelvis were performed following the administration of intravenous contrast. Images were obtained in the axial plane and coronal reformatted images were created and reviewed. Dose reduction technique used: Automated exposure control/Adjustment of the mA and/or kV according to patient size/Use of iterative reconstruction technique. 100 mL of Isovue-370 were utilized. COMPARISON: 11/17/2019 FINDINGS: The visualized lung bases and mediastinum are unremarkable. The liver, spleen, pancreas, adrenal glands, gallbladder, and kidneys are within normal limits. Stable low-density lesion within the right kidney. Too small to characterize. The bladder appears grossly normal. Again there is a fibroid uterus. A structure felt to represent the left ovary is seen and appears within normal limits. A right ovary is not defined. Distal esophagus and stomach are unremarkable. The large bowel shows no evidence of obstruction. There is a normal appendix in the right lower quadrant. There is a small anterior abdominal wall hernia located to the right of the umbilicus which contains an incarcerated loop of small bowel. There is fecalization of the small bowel loops proximal to the hernia. No evidence of free fluid or free air. No pathologic adenopathy. No abdominal aortic aneurysm. Osseous structures show no evidence of acute fracture or suspicious lesion. There is an incarcerated loop of small bowel within an anterior abdominal wall hernia located to the right of the umbilicus. This is causing small bowel obstruction. There is no free air or free fluid. Fibroid uterus. Other findings as above. XR CHEST PORTABLE    Result Date: 8/21/2022  EXAM: XR CHEST PORTABLE HISTORY: admission. TECHNIQUE: Frontal chest. COMPARISON: None available.  FINDINGS: The cardiac silhouette, mediastinum, and pulmonary vasculature are within normal limits. There is no consolidation, pleural effusion, or pneumothorax. No significant osseous abnormalities are observed. No evidence of an acute intrathoracic process. Echocardiogram:  No results found for this or any previous visit.       Meds previously ordered:  Orders Placed This Encounter   Medications    0.9 % sodium chloride bolus    ondansetron (ZOFRAN) injection 4 mg    DISCONTD: sodium chloride flush 0.9 % injection 5-40 mL    DISCONTD: sodium chloride flush 0.9 % injection 5-40 mL    DISCONTD: 0.9 % sodium chloride infusion    DISCONTD: HYDROmorphone HCl PF (DILAUDID) injection 0.25 mg    DISCONTD: HYDROmorphone HCl PF (DILAUDID) injection 0.5 mg    oxyCODONE (ROXICODONE) immediate release tablet 5 mg    prochlorperazine (COMPAZINE) injection 5 mg    haloperidol lactate (HALDOL) injection 1 mg    diphenhydrAMINE (BENADRYL) injection 12.5 mg    DISCONTD: labetalol (NORMODYNE;TRANDATE) injection 10 mg    DISCONTD: hydrALAZINE (APRESOLINE) injection 10 mg    DISCONTD: bupivacaine (PF) (MARCAINE) 0.5 % injection    0.9 % sodium chloride infusion    sodium chloride flush 0.9 % injection 5-40 mL    sodium chloride flush 0.9 % injection 5-40 mL    0.9 % sodium chloride infusion    OR Linked Order Group     ondansetron (ZOFRAN-ODT) disintegrating tablet 4 mg     ondansetron (ZOFRAN) injection 4 mg    famotidine (PEPCID) 20 mg in sodium chloride (PF) 10 mL injection    OR Linked Order Group     acetaminophen (TYLENOL) tablet 650 mg     acetaminophen (TYLENOL) suppository 650 mg    DISCONTD: HYDROmorphone HCl PF (DILAUDID) injection 1 mg    oxyCODONE (ROXICODONE) immediate release tablet 5 mg    ketorolac (TORADOL) injection 15 mg    HYDROmorphone (DILAUDID) 1 MG/ML injection     Sri Neely: cabinet override    HYDROmorphone HCl PF (DILAUDID) injection 1 mg    enoxaparin (LOVENOX) injection 40 mg     Order Specific Question: Indication of Use     Answer:   Prophylaxis-DVT/PE    medicated lip ointment (BLISTEX)         Signed:  Giulia Urbina DO    Part of this note may have been written by using a voice dictation software. The note has been proof read but may still contain some grammatical/other typographical errors.

## 2022-08-22 NOTE — ED NOTES
Surgery team arrived. Report and all paperwork given.  Pt taken in [facility]  08/21/22 2182

## 2022-08-22 NOTE — OP NOTE
Operative Note      Patient: Champ Cleary  YOB: 1960  MRN: [id number]    Date of Procedure: 8/21/2022    Pre-Op Diagnosis: Incarcerated umbilical hernia    Post-Op Diagnosis: same       PROCEDURE LAPAROTOMY, Ventral hernia repair     Surgeon(s):  Channing Montiel MD    Assistant:   * No surgical staff found *    Anesthesia: General    Estimated Blood Loss (mL): 50 cc    Complications: none     Specimens:   None     Implants:  None       Drains: * No LDAs found *    Findings: 3 contiguous ventral defects     Detailed Description of Procedure:   Taken to the OR, sedated, intubated and anesthetized   IV ancef  give. Abdomen prepped and draped. Mid line laparotomy incision performed   Small bowel reduced; bowel was viable, no resection needed. Hernia defect al together 12 cm. No mesh placed due to presence of murky fluid. Fascia closed with # 1 loop PDS. Skin approximated with staples.   Patient did tolerate the procedure well and was taken extubated to recovery       Electronically signed by Faye Newman MD on 8/22/2022 at 12:40 AM

## 2022-08-22 NOTE — PROGRESS NOTES
Admit Date: 2022    POD 1 Day Post-Op    HPI: Raghavendra Wu is a 60 y.o. female who Presented to the ER with 12 hours of abdominal pain, nausea and vomiting on 2022. She had a previous h/o umbilical hernia repair in 2019 done per DR. Haney.  CT done on admission showed a new incarcerated hernia causing a SBO  PT was taken to OR per Dr. Sabino Barron 2022 at 11:27 pm.        Procedure:  Procedure(s):  Ventral hernia repair done 2022 per Dr. Sabino Barron    Subjective:     Patient has complaints of pain at incisional area to midline. PT reports pain 4 on scale 1 out of 10. Pt denies any nausea or vomiting. Pt denies any flatus at this time. Patient will be NPO except ice chips per Dr. Yuly Snider due to surgery last night and no flatus at this time. Will order for patient to be up out of bed and ambulating today. Objective:       Vitals:    22 0115 22 0120 22 0224 22 0711   BP: (!) 119/59 118/60 116/64 112/64   Pulse: 73 72 72 75   Resp: 16 16 16 16   Temp:   97.9 °F (36.6 °C) 97.7 °F (36.5 °C)   TempSrc:   Oral Oral   SpO2: 92% 93% 95% 93%   Weight:       Height:           Temp (24hrs), Av.2 °F (36.8 °C), Min:97.7 °F (36.5 °C), Max:99.1 °F (37.3 °C)  . I&O reviewed as documented. Intake/Output Summary (Last 24 hours) at 2022 0802  Last data filed at 2022 0503  Gross per 24 hour   Intake 1000 ml   Output 350 ml   Net 650 ml      ROS: The patient has no difficulty with chest pain or shortness of breath. No fever or chills. Comprehensive review of systems was otherwise unremarkable except as noted above. Physical Exam:     Physical Exam  Vitals and nursing note reviewed. Constitutional:       Appearance: Normal appearance. HENT:      Head: Normocephalic. Nose: Nose normal.      Mouth/Throat:      Mouth: Mucous membranes are moist.   Eyes:      Pupils: Pupils are equal, round, and reactive to light.    Cardiovascular:      Rate and Rhythm: Normal rate and regular rhythm. Pulses: Normal pulses. Heart sounds: Normal heart sounds. Pulmonary:      Effort: Pulmonary effort is normal.      Breath sounds: Normal breath sounds. Abdominal:      Palpations: Abdomen is soft. Comments: Appropriate tenderness noted to midline incision. Dressing clean/dry and intact to midline. Hypoactive BS noted. Minimal distention. Musculoskeletal:         General: Normal range of motion. Cervical back: Normal range of motion. Skin:     General: Skin is warm. Capillary Refill: Capillary refill takes less than 2 seconds. Neurological:      General: No focal deficit present. Mental Status: She is alert and oriented to person, place, and time.    Psychiatric:         Mood and Affect: Mood normal.         Behavior: Behavior normal.          Labs:   Recent Results (from the past 24 hour(s))   CBC with Diff    Collection Time: 08/21/22  6:14 PM   Result Value Ref Range    WBC 7.5 4.3 - 11.1 K/uL    RBC 4.61 4.05 - 5.2 M/uL    Hemoglobin 12.4 11.7 - 15.4 g/dL    Hematocrit 38.9 35.8 - 46.3 %    MCV 84.4 79.6 - 97.8 FL    MCH 26.9 26.1 - 32.9 PG    MCHC 31.9 31.4 - 35.0 g/dL    RDW 14.7 (H) 11.9 - 14.6 %    Platelets 096 884 - 729 K/uL    MPV 8.6 (L) 9.4 - 12.3 FL    nRBC 0.00 0.0 - 0.2 K/uL    Differential Type AUTOMATED      Seg Neutrophils 80 (H) 43 - 78 %    Lymphocytes 16 13 - 44 %    Monocytes 3 (L) 4.0 - 12.0 %    Eosinophils % 1 0.5 - 7.8 %    Basophils 1 0.0 - 2.0 %    Immature Granulocytes 0 0.0 - 5.0 %    Segs Absolute 6.0 1.7 - 8.2 K/UL    Absolute Lymph # 1.2 0.5 - 4.6 K/UL    Absolute Mono # 0.3 0.1 - 1.3 K/UL    Absolute Eos # 0.0 0.0 - 0.8 K/UL    Basophils Absolute 0.1 0.0 - 0.2 K/UL    Absolute Immature Granulocyte 0.0 0.0 - 0.5 K/UL   CMP    Collection Time: 08/21/22  6:14 PM   Result Value Ref Range    Sodium 137 136 - 145 mmol/L    Potassium 3.8 3.5 - 5.1 mmol/L    Chloride 107 98 - 107 mmol/L    CO2 25 21 - 32 mmol/L    Anion Gap 5 (L) 7 - 16 mmol/L    Glucose 135 (H) 65 - 100 mg/dL    BUN 14 8 - 23 MG/DL    Creatinine 0.64 0.6 - 1.0 MG/DL    GFR African American >60 >60 ml/min/1.73m2    GFR Non- >60 >60 ml/min/1.73m2    Calcium 9.4 8.3 - 10.4 MG/DL    Total Bilirubin 0.4 0.2 - 1.1 MG/DL    ALT 22 12 - 65 U/L    AST 18 15 - 37 U/L    Alk Phosphatase 60 50 - 130 U/L    Total Protein 7.9 6.3 - 8.2 g/dL    Albumin 3.5 3.2 - 4.6 g/dL    Globulin 4.4 (H) 2.3 - 3.5 g/dL    Albumin/Globulin Ratio 0.8 (L) 1.2 - 3.5     Lipase    Collection Time: 08/21/22  6:14 PM   Result Value Ref Range    Lipase 175 73 - 393 U/L   Protime-INR    Collection Time: 08/21/22  9:55 PM   Result Value Ref Range    Protime 20.8 (H) 12.6 - 14.5 sec    INR 1.7     APTT    Collection Time: 08/21/22  9:55 PM   Result Value Ref Range    PTT 26.2 24.1 - 35.1 SEC   EKG 12 Lead    Collection Time: 08/21/22  9:59 PM   Result Value Ref Range    Ventricular Rate 88 BPM    Atrial Rate 88 BPM    P-R Interval 174 ms    QRS Duration 70 ms    Q-T Interval 378 ms    QTc Calculation (Bazett) 457 ms    P Axis 72 degrees    R Axis 44 degrees    T Axis 43 degrees    Diagnosis Normal sinus rhythm        Data Review OP note:  Operative Note        Patient: Madhavi Coreas  YOB: 1960  MRN: [id number]     Date of Procedure: 8/21/2022     Pre-Op Diagnosis: Incarcerated umbilical hernia     Post-Op Diagnosis: same       PROCEDURE LAPAROTOMY, Ventral hernia repair      Surgeon(s):  Kevin Maier MD     Assistant:   * No surgical staff found *     Anesthesia: General     Estimated Blood Loss (mL): 50 cc     Complications: none      Specimens:   None      Implants:  None       Drains: * No LDAs found *     Findings: 3 contiguous ventral defects      Detailed Description of Procedure:   Taken to the OR, sedated, intubated and anesthetized   IV ancef  give. Abdomen prepped and draped.   Mid line laparotomy incision performed   Small bowel reduced; bowel was viable, no resection needed. Hernia defect al together 12 cm. No mesh placed due to presence of murky fluid. Fascia closed with # 1 loop PDS. Skin approximated with staples. Radiology Results (most recent):    CT ABDOMEN PELVIS W IV CONTRAST Additional Contrast? None    Result Date: 8/21/2022  EXAM: CT ABDOMEN PELVIS W IV CONTRAST HISTORY: abd pain; vomiting. TECHNIQUE: Axial images of the abdomen and pelvis were performed following the administration of intravenous contrast. Images were obtained in the axial plane and coronal reformatted images were created and reviewed. Dose reduction technique used: Automated exposure control/Adjustment of the mA and/or kV according to patient size/Use of iterative reconstruction technique. 100 mL of Isovue-370 were utilized. COMPARISON: 11/17/2019 FINDINGS: The visualized lung bases and mediastinum are unremarkable. The liver, spleen, pancreas, adrenal glands, gallbladder, and kidneys are within normal limits. Stable low-density lesion within the right kidney. Too small to characterize. The bladder appears grossly normal. Again there is a fibroid uterus. A structure felt to represent the left ovary is seen and appears within normal limits. A right ovary is not defined. Distal esophagus and stomach are unremarkable. The large bowel shows no evidence of obstruction. There is a normal appendix in the right lower quadrant. There is a small anterior abdominal wall hernia located to the right of the umbilicus which contains an incarcerated loop of small bowel. There is fecalization of the small bowel loops proximal to the hernia. No evidence of free fluid or free air. No pathologic adenopathy. No abdominal aortic aneurysm. Osseous structures show no evidence of acute fracture or suspicious lesion. There is an incarcerated loop of small bowel within an anterior abdominal wall hernia located to the right of the umbilicus. This is causing small bowel obstruction.  There is no free air or free fluid. Fibroid uterus. Other findings as above. XR CHEST PORTABLE    Result Date: 8/21/2022  EXAM: XR CHEST PORTABLE HISTORY: admission. TECHNIQUE: Frontal chest. COMPARISON: None available. FINDINGS: The cardiac silhouette, mediastinum, and pulmonary vasculature are within normal limits. There is no consolidation, pleural effusion, or pneumothorax. No significant osseous abnormalities are observed. No evidence of an acute intrathoracic process. Assessment:     Principal Problem:    Incarcerated incisional hernia  Active Problems:    H/O umbilical hernia repair    Obesity, morbid ([address]  Resolved Problems:    * No resolved hospital problems. *        Plan/Recommendations/Medical Decision Making:     Continue present treatment as discussed with DR. Rod Hancock NPO for now per order DR. Abraham/IVF's for now  Ambulate in lacey TID PRN  Incentive spirometer use PRN  PT will follow up outpatient with Dr. Terrance Herrera as she is known to him from prior surgery in past.   Consult Internal Medicine team for management of meds,etc as has hx of HTN,Type 2 DM. Add Lovenox 40 mg Sc daily for prophylaxis treatment as patient is post op day 1.     YVES Arnold - NP

## 2022-08-22 NOTE — ANESTHESIA PRE PROCEDURE
Department of Anesthesiology  Preprocedure Note       Name:  Lindsey Hernández   Age:  60 y.o.  :  1960                                          MRN:  [id number]         Date:  2022      Surgeon: Jeff Avila):  Saurav Doyle MD    Procedure: Procedure(s): HERNIA UMBILICAL REPAIR    Medications prior to admission:   Prior to Admission medications    Medication Sig Start Date End Date Taking? Authorizing Provider   simvastatin (ZOCOR) 10 MG tablet  3/8/22  Yes Historical Provider, MD Kurtz Zingiber officinalis, (KAREN ROOT PO) Take by mouth   Yes Historical Provider, MD   Flaxseed, Linseed, (FLAXSEED OIL PO) Take by mouth   Yes Historical Provider, MD   CALCIUM PO Take by mouth    Ar Automatic Reconciliation   amLODIPine-olmesartan (JONAS) 5-40 MG per tablet 1 tab po once daily for blood pressure 19   Ar Automatic Reconciliation   aspirin 81 MG chewable tablet Take by mouth  Patient not taking: Reported on 2022    Ar Automatic Reconciliation   Dulaglutide 1.5 MG/0.5ML SOPN Inject 1.5 mg into the skin every 7 days 19   Ar Automatic Reconciliation   metFORMIN (GLUCOPHAGE) 1000 MG tablet Take 1,000 mg by mouth daily 19   Ar Automatic Reconciliation   vitamin E 1000 units capsule Take by mouth    Ar Automatic Reconciliation       Current medications:    No current facility-administered medications for this encounter. Facility-Administered Medications Ordered in Other Encounters   Medication Dose Route Frequency Provider Last Rate Last Admin    lactated ringers infusion   IntraVENous Continuous PRN Nani Stable, APRN - CRNA   New Bag at 22 2328    fentaNYL (SUBLIMAZE) injection   IntraVENous PRN Nani Stable, APRN - CRNA   100 mcg at 22 2331       Allergies:     Allergies   Allergen Reactions    Penicillins Itching and Swelling       Problem List:    Patient Active Problem List   Diagnosis Code    Postmenopausal Z78.0    Obesity, morbid ([facility] E66.01    Iron deficiency anemia D50.9    SBO (small bowel obstruction) ([facility] K56.609    Hypertension I10    Allergic rhinitis J30.9    ELIZABET (obstructive sleep apnea) G47.33    Diabetes mellitus type 2, controlled ([facility] E11.9       Past Medical History:        Diagnosis Date    Allergic rhinitis     mild, seasonal    [facility] 2005    Hypertension 2005    Iron deficiency anemia     Postmenopausal        Past Surgical History:        Procedure Laterality Date    HX OPEN REDUCTION INTERNAL FIXATION  1995    right femur,        Social History:    Social History     Tobacco Use    Smoking status: Never    Smokeless tobacco: Never   Substance Use Topics    Alcohol use: No     Alcohol/week: 0.0 standard drinks                                Counseling given: Not Answered      Vital Signs (Current):   Vitals:    08/21/22 2230 08/21/22 2240 08/21/22 2250 08/21/22 2312   BP: (!) 140/67  (!) 123/58 126/61   Pulse: 95 90 93 86   Resp: 19      Temp:       TempSrc:       SpO2: 99% 97% 98% 96%   Weight:       Height:                                                  BP Readings from Last 3 Encounters:   08/21/22 126/61       NPO Status:                                                                                 BMI:   Wt Readings from Last 3 Encounters:   08/21/22 172 lb (78 kg)     Body mass index is 32.5 kg/m².     CBC:   Lab Results   Component Value Date/Time    WBC 7.5 08/21/2022 06:14 PM    RBC 4.61 08/21/2022 06:14 PM    HGB 12.4 08/21/2022 06:14 PM    HCT 38.9 08/21/2022 06:14 PM    MCV 84.4 08/21/2022 06:14 PM    RDW 14.7 08/21/2022 06:14 PM     08/21/2022 06:14 PM       CMP:   Lab Results   Component Value Date/Time     08/21/2022 06:14 PM    K 3.8 08/21/2022 06:14 PM     08/21/2022 06:14 PM    CO2 25 08/21/2022 06:14 PM    BUN 14 08/21/2022 06:14 PM    CREATININE 0.64 08/21/2022 06:14 PM    GFRAA >60 08/21/2022 06:14 PM    AGRATIO 0.9 11/17/2019 07:58 PM    LABGLOM >60 08/21/2022 06:14 PM    GLUCOSE 135 08/21/2022 06:14 PM    PROT 7.9 08/21/2022 06:14 PM    CALCIUM 9.4 08/21/2022 06:14 PM    BILITOT 0.4 08/21/2022 06:14 PM    ALKPHOS 60 08/21/2022 06:14 PM    ALKPHOS 69 11/17/2019 07:58 PM    AST 18 08/21/2022 06:14 PM    ALT 22 08/21/2022 06:14 PM       POC Tests: No results for input(s): POCGLU, POCNA, POCK, POCCL, POCBUN, POCHEMO, POCHCT in the last 72 hours. Coags:   Lab Results   Component Value Date/Time    PROTIME 20.8 08/21/2022 09:55 PM    INR 1.7 08/21/2022 09:55 PM    APTT 26.2 08/21/2022 09:55 PM    APTT 24.8 11/18/2019 04:30 AM       HCG (If Applicable): No results found for: PREGTESTUR, PREGSERUM, HCG, HCGQUANT     ABGs: No results found for: PHART, PO2ART, SWQ3TDX, BEQ5ECO, BEART, G4OVIZGJ     Type & Screen (If Applicable):  No results found for: LABABO, LABRH    Drug/Infectious Status (If Applicable):  No results found for: HIV, HEPCAB    COVID-19 Screening (If Applicable): No results found for: COVID19        Anesthesia Evaluation  Patient summary reviewed and Nursing notes reviewed  Airway: Mallampati: II  TM distance: >3 FB   Neck ROM: full  Mouth opening: > = 3 FB   Dental:          Pulmonary: breath sounds clear to auscultation  (+) sleep apnea:                             Cardiovascular:  Exercise tolerance: good (>4 METS),   (+) hypertension:,         Rhythm: regular  Rate: normal                    Neuro/Psych:   Negative Neuro/Psych ROS              GI/Hepatic/Renal:            ROS comment: SBO. Endo/Other:    (+) DiabetesType II DM, well controlled, , .                 Abdominal:             Vascular: negative vascular ROS. Other Findings:           Anesthesia Plan      general     ASA 2 - emergent       Induction: intravenous and rapid sequence. Anesthetic plan and risks discussed with patient and spouse.                         Zoe Rubin MD   8/21/2022

## 2022-08-22 NOTE — PERIOP NOTE
TRANSFER - OUT REPORT:    Verbal report given to [facility] RN on [facility]  being transferred to [facility] for routine post-op       Report consisted of patient's Situation, Background, Assessment and   Recommendations(SBAR). Information from the following report(s) Nurse Handoff Report was reviewed with the receiving nurse. Lines:   Peripheral IV 08/21/22 Distal;Left Forearm (Active)   Site Assessment Clean, dry & intact 08/22/22 0054   Line Status Infusing 08/22/22 0054   Phlebitis Assessment No symptoms 08/22/22 0054   Infiltration Assessment 0 08/22/22 0054   Dressing Status Clean, dry & intact 08/22/22 0054   Dressing Type Transparent 08/22/22 0054        Opportunity for questions and clarification was provided.       Patient transported with:  Registered Nurse

## 2022-08-22 NOTE — CARE COORDINATION
08/22/22 1503   Service Assessment   Patient Orientation Alert and Oriented   Cognition Alert   History Provided By Patient   Primary Caregiver Family   Accompanied By/Relationship Matt/spouse #[phone]   Support Systems Spouse/Significant Other;Family Members   Patient's Healthcare Decision Maker is: Legal Next of Kin   PCP Verified by CM Yes   Prior Functional Level Independent in ADLs/IADLs   Current Functional Level Independent in ADLs/IADLs   Can patient return to prior living arrangement Yes   Ability to make needs known: Good   Family able to assist with home care needs: Yes   Would you like for me to discuss the discharge plan with any other family members/significant others, and if so, who? No   Social/Functional History   Lives With Spouse   Type of [address] One level   ADL Assistance Independent   Homemaking Assistance Independent   Ambulation Assistance Independent   Transfer Assistance Independent   Active  Yes   Mode of Transportation Car   Discharge Planning   Type of [address] Prior To Admission None   Type of [address] Discharge   [address] Discharge None   Confirm Follow Up Transport Family   Condition of Participation: Discharge Planning   Freedom of Choice list was provided with basic dialogue that supports the patient's individualized plan of care/goals, treatment preferences, and shares the quality data associated with the providers? Yes  (will provide if needed)   Visited with pt to establish prior to admission baseline and discuss their anticipated goals at time of d/c.Pt came in c/o abd pain with N/V x1 day. PT with Hs of DM and HTN, she was admitted with a hernia. Surgery following, conservative measures for now & pt is NPO. Pt goal is to return home with family, denies any needs.  PT Rx are filled at [facility] in [facility]. CM to follow for any needs.

## 2022-08-22 NOTE — H&P
H&P/Consult Note/Progress Note/Office Note:   Gorge Mathew  MRN: [id number]  :1960  Age:62 y.o.    HPI: Gorge Mathew is a 60 y.o. female who   Presents to the ER with 12 hours of abdominal pain, nausea and vomiting. She had a previous h/o umbilical hernia repair in 2019. CT showed a new incarcerated hernia causing a SBO        Past Medical History:   Diagnosis Date    Allergic rhinitis     mild, seasonal    Diabetes mellitus type 2, controlled ([facility]     Hypertension     Iron deficiency anemia     Postmenopausal      Past Surgical History:   Procedure Laterality Date    HX OPEN REDUCTION INTERNAL FIXATION      right femur,      No current facility-administered medications for this encounter.      Current Outpatient Medications   Medication Sig    simvastatin (ZOCOR) 10 MG tablet     Karen, Zingiber officinalis, (KAREN ROOT PO) Take by mouth    Flaxseed, Linseed, (FLAXSEED OIL PO) Take by mouth    CALCIUM PO Take by mouth    amLODIPine-olmesartan (JONAS) 5-40 MG per tablet 1 tab po once daily for blood pressure    aspirin 81 MG chewable tablet Take by mouth (Patient not taking: Reported on 2022)    Dulaglutide 1.5 MG/0.5ML SOPN Inject 1.5 mg into the skin every 7 days    metFORMIN (GLUCOPHAGE) 1000 MG tablet Take 1,000 mg by mouth daily    vitamin E 1000 units capsule Take by mouth     ALLERGIES:  Penicillins    Social History     Socioeconomic History    Marital status:      Spouse name: None    Number of children: None    Years of education: None    Highest education level: None   Tobacco Use    Smoking status: Never    Smokeless tobacco: Never   Substance and Sexual Activity    Alcohol use: No     Alcohol/week: 0.0 standard drinks    Drug use: No   Social History "[employer]    [place]ia, in [employer] 32yo    works [employer], 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 For this visit I also reviewed old records and prior notes. Recent Labs     08/21/22  1814 08/21/22  2155   WBC 7.5  --    HGB 12.4  --      --      --    K 3.8  --      --    CO2 25  --    BUN 14  --    INR  --  1.7   APTT  --  26.2   ALT 22  --      Review of most recent CBC  Lab Results   Component Value Date    WBC 7.5 08/21/2022    HGB 12.4 08/21/2022    HCT 38.9 08/21/2022    MCV 84.4 08/21/2022     08/21/2022       Review of most recent BMP  Lab Results   Component Value Date/Time     08/21/2022 06:14 PM    K 3.8 08/21/2022 06:14 PM     08/21/2022 06:14 PM    CO2 25 08/21/2022 06:14 PM    BUN 14 08/21/2022 06:14 PM    CREATININE 0.64 08/21/2022 06:14 PM    GLUCOSE 135 08/21/2022 06:14 PM    CALCIUM 9.4 08/21/2022 06:14 PM        Review of most recent LFTs (and lipase if done)  Lab Results   Component Value Date    ALT 22 08/21/2022    AST 18 08/21/2022    ALKPHOS 60 08/21/2022    BILITOT 0.4 08/21/2022     Lab Results   Component Value Date    LIPASE 175 08/21/2022       Lab Results   Component Value Date/Time    INR 1.7 08/21/2022 09:55 PM    APTT 26.2 08/21/2022 09:55 PM    APTT 24.8 11/18/2019 04:30 AM       Review of most recent HgbA1c  No results found for: LABA1C  No results found for: EAG    Nutritional assessment screen for wound healing issues:  Lab Results   Component Value Date    LABALBU 3.5 08/21/2022       @lastcovr@    Xray Result (most recent):  XR CHEST PORTABLE 08/21/2022    Narrative  EXAM: XR CHEST PORTABLE    HISTORY: admission. TECHNIQUE: Frontal chest.    COMPARISON: None available.     FINDINGS:  The cardiac silhouette, mediastinum, and pulmonary vasculature are within normal  limits. There is no consolidation, pleural effusion, or pneumothorax. No significant osseous abnormalities are observed. Impression  No evidence of an acute intrathoracic process. CT Result (most recent):  CT ABDOMEN PELVIS W IV CONTRAST 08/21/2022    Narrative  EXAM: CT ABDOMEN PELVIS W IV CONTRAST    HISTORY: abd pain; vomiting. TECHNIQUE: Axial images of the abdomen and pelvis were performed following the  administration of intravenous contrast. Images were obtained in the axial plane  and coronal reformatted images were created and reviewed. Dose reduction technique used: Automated exposure control/Adjustment of the mA  and/or kV according to patient size/Use of iterative reconstruction technique. 100 mL of Isovue-370 were utilized. COMPARISON: 11/17/2019    FINDINGS:  The visualized lung bases and mediastinum are unremarkable. The liver, spleen, pancreas, adrenal glands, gallbladder, and kidneys are within  normal limits. Stable low-density lesion within the right kidney. Too small to  characterize. The bladder appears grossly normal.    Again there is a fibroid uterus. A structure felt to represent the left ovary is  seen and appears within normal limits. A right ovary is not defined. Distal esophagus and stomach are unremarkable. The large bowel shows no evidence  of obstruction. There is a normal appendix in the right lower quadrant. There is a small anterior abdominal wall hernia located to the right of the  umbilicus which contains an incarcerated loop of small bowel. There is  fecalization of the small bowel loops proximal to the hernia. No evidence of free fluid or free air. No pathologic adenopathy. No abdominal  aortic aneurysm. Osseous structures show no evidence of acute fracture or suspicious lesion.     Impression  There is an incarcerated loop of small bowel within an anterior abdominal wall  hernia located to the right of the umbilicus. This is causing small bowel  obstruction. There is no free air or free fluid. Fibroid uterus. Other findings as above. US Result (most recent):  No results found for this or any previous visit from the past 3650 days. Admission date (for inpatients): 8/21/2022   Day of Surgery  Procedure(s): HERNIA UMBILICAL REPAIR        ASSESSMENT/PLAN:  [unfilled]  Active Problems:    * No active hospital problems. *  Resolved Problems:    * No resolved hospital problems.  *     Patient Active Problem List    Diagnosis Date Noted    SBO (small bowel obstruction) ([facility] 11/17/2019    ELIZABET (obstructive sleep apnea) 04/19/2018    Obesity, morbid ([facility] 02/20/2018    Postmenopausal     Iron deficiency anemia     Hypertension     Allergic rhinitis      mild, seasonal        Diabetes mellitus type 2, controlled ([facility])           A/P  SBO due to incarcerated umbilical hernia     -laparotomy and umbilical hernia repair     NPO  IVF

## 2022-08-22 NOTE — ED NOTES
Pt moved to room in [facility]. Pt removed all clothing and jewelry. Procedure consent and anesthesia consent unsigned on chart. Blood consent signed on chart.      Joyce Lee RN  08/21/22 6903

## 2022-08-23 LAB
ALBUMIN SERPL-MCNC: 2.8 G/DL (ref 3.2–4.6)
ALBUMIN/GLOB SERPL: 0.9 {RATIO} (ref 1.2–3.5)
ALP SERPL-CCNC: 48 U/L (ref 50–136)
ALT SERPL-CCNC: 16 U/L (ref 12–65)
ANION GAP SERPL CALC-SCNC: 4 MMOL/L (ref 7–16)
AST SERPL-CCNC: 18 U/L (ref 15–37)
BASOPHILS # BLD: 0 K/UL (ref 0–0.2)
BASOPHILS NFR BLD: 0 % (ref 0–2)
BILIRUB SERPL-MCNC: 0.9 MG/DL (ref 0.2–1.1)
BUN SERPL-MCNC: 14 MG/DL (ref 8–23)
CALCIUM SERPL-MCNC: 8.5 MG/DL (ref 8.3–10.4)
CHLORIDE SERPL-SCNC: 111 MMOL/L (ref 98–107)
CO2 SERPL-SCNC: 27 MMOL/L (ref 21–32)
CREAT SERPL-MCNC: 0.54 MG/DL (ref 0.6–1)
DIFFERENTIAL METHOD BLD: ABNORMAL
EOSINOPHIL # BLD: 0 K/UL (ref 0–0.8)
EOSINOPHIL NFR BLD: 0 % (ref 0.5–7.8)
ERYTHROCYTE [DISTWIDTH] IN BLOOD BY AUTOMATED COUNT: 14.9 % (ref 11.9–14.6)
GLOBULIN SER CALC-MCNC: 3.2 G/DL (ref 2.3–3.5)
GLUCOSE SERPL-MCNC: 82 MG/DL (ref 65–100)
HCT VFR BLD AUTO: 33 % (ref 35.8–46.3)
HGB BLD-MCNC: 10.5 G/DL (ref 11.7–15.4)
IMM GRANULOCYTES # BLD AUTO: 0.1 K/UL (ref 0–0.5)
IMM GRANULOCYTES NFR BLD AUTO: 1 % (ref 0–5)
LYMPHOCYTES # BLD: 1.7 K/UL (ref 0.5–4.6)
LYMPHOCYTES NFR BLD: 19 % (ref 13–44)
MCH RBC QN AUTO: 26.9 PG (ref 26.1–32.9)
MCHC RBC AUTO-ENTMCNC: 31.8 G/DL (ref 31.4–35)
MCV RBC AUTO: 84.6 FL (ref 79.6–97.8)
MONOCYTES # BLD: 0.5 K/UL (ref 0.1–1.3)
MONOCYTES NFR BLD: 6 % (ref 4–12)
NEUTS SEG # BLD: 6.5 K/UL (ref 1.7–8.2)
NEUTS SEG NFR BLD: 75 % (ref 43–78)
NRBC # BLD: 0 K/UL (ref 0–0.2)
PLATELET # BLD AUTO: 240 K/UL (ref 150–450)
PMV BLD AUTO: 8.5 FL (ref 9.4–12.3)
POTASSIUM SERPL-SCNC: 3.6 MMOL/L (ref 3.5–5.1)
PROT SERPL-MCNC: 6 G/DL (ref 6.3–8.2)
RBC # BLD AUTO: 3.9 M/UL (ref 4.05–5.2)
SODIUM SERPL-SCNC: 142 MMOL/L (ref 136–145)
WBC # BLD AUTO: 8.7 K/UL (ref 4.3–11.1)

## 2022-08-23 PROCEDURE — 80053 COMPREHEN METABOLIC PANEL: CPT

## 2022-08-23 PROCEDURE — 1100000000 HC RM PRIVATE

## 2022-08-23 PROCEDURE — 85025 COMPLETE CBC W/AUTO DIFF WBC: CPT

## 2022-08-23 PROCEDURE — 2580000003 HC RX 258: Performed by: NURSE PRACTITIONER

## 2022-08-23 PROCEDURE — 2500000003 HC RX 250 WO HCPCS: Performed by: NURSE PRACTITIONER

## 2022-08-23 PROCEDURE — 6360000002 HC RX W HCPCS: Performed by: NURSE PRACTITIONER

## 2022-08-23 PROCEDURE — 36415 COLL VENOUS BLD VENIPUNCTURE: CPT

## 2022-08-23 RX ADMIN — SODIUM CHLORIDE, PRESERVATIVE FREE 20 MG: 5 INJECTION INTRAVENOUS at 20:14

## 2022-08-23 RX ADMIN — SODIUM CHLORIDE: 900 INJECTION, SOLUTION INTRAVENOUS at 23:50

## 2022-08-23 RX ADMIN — ENOXAPARIN SODIUM 40 MG: 100 INJECTION SUBCUTANEOUS at 09:09

## 2022-08-23 RX ADMIN — SODIUM CHLORIDE: 900 INJECTION, SOLUTION INTRAVENOUS at 03:56

## 2022-08-23 RX ADMIN — SODIUM CHLORIDE, PRESERVATIVE FREE 10 ML: 5 INJECTION INTRAVENOUS at 20:15

## 2022-08-23 RX ADMIN — SODIUM CHLORIDE, PRESERVATIVE FREE 20 MG: 5 INJECTION INTRAVENOUS at 09:08

## 2022-08-23 NOTE — PROGRESS NOTES
Hourly rounds completed this shift. Patient resting in recliner. Patient walked 3 laps around all this shift. Tolerating diet. +flatus, no BM. All needs assessed at this time.

## 2022-08-23 NOTE — PROGRESS NOTES
Admit Date: 2022    POD 2 Days Post-Op    HPI: Sunil Melvin is a 60 y.o. female who Presented to the ER with 12 hours of abdominal pain, nausea and vomiting on 2022. She had a previous h/o umbilical hernia repair in 2019 done per DR. Haney.  CT done on admission showed a new incarcerated hernia causing a SBO  PT was taken to OR per Dr. Kristina Alegria 2022 at 11:27 pm.        Procedure:  Procedure(s):  Ventral hernia repair done 2022 per Dr. Kristina Alegria    Subjective:     Patient has complaints of pain at incisional area to midline. PT reports pain 3 on scale 1 out of 10. Pt denies any nausea or vomiting. Pt denies any flatus at this time. Patient will be sips of clears only  per Dr. Donna Rodriguez. Patient has been out of bed and ambulated in lacey. Objective:       Vitals:    22 1551 22 1929 22 2328 22 0326   BP: 114/64 118/65 117/67 119/68   Pulse: 64 58 63 59   Resp: 16 18 18 18   Temp: 98.1 °F (36.7 °C) 98.4 °F (36.9 °C) 98.1 °F (36.7 °C) 97.9 °F (36.6 °C)   TempSrc: Oral Oral Oral Oral   SpO2: 96% 98% 93% 95%   Weight:       Height:           Temp (24hrs), Av °F (36.7 °C), Min:97.7 °F (36.5 °C), Max:98.4 °F (36.9 °C)  . I&O reviewed as documented. Intake/Output Summary (Last 24 hours) at 2022 0827  Last data filed at 2022 0355  Gross per 24 hour   Intake 1860 ml   Output --   Net 1860 ml        ROS: The patient has no difficulty with chest pain or shortness of breath. No fever or chills. Comprehensive review of systems was otherwise unremarkable except as noted above. Physical Exam:     Physical Exam  Vitals and nursing note reviewed. Constitutional:       Appearance: Normal appearance. HENT:      Head: Normocephalic. Nose: Nose normal.      Mouth/Throat:      Mouth: Mucous membranes are moist.   Eyes:      Pupils: Pupils are equal, round, and reactive to light. Cardiovascular:      Rate and Rhythm: Normal rate and regular rhythm. Platelets 343 534 - 035 K/uL    MPV 8.5 (L) 9.4 - 12.3 FL    nRBC 0.00 0.0 - 0.2 K/uL    Differential Type AUTOMATED      Seg Neutrophils 75 43 - 78 %    Lymphocytes 19 13 - 44 %    Monocytes 6 4.0 - 12.0 %    Eosinophils % 0 (L) 0.5 - 7.8 %    Basophils 0 0.0 - 2.0 %    Immature Granulocytes 1 0.0 - 5.0 %    Segs Absolute 6.5 1.7 - 8.2 K/UL    Absolute Lymph # 1.7 0.5 - 4.6 K/UL    Absolute Mono # 0.5 0.1 - 1.3 K/UL    Absolute Eos # 0.0 0.0 - 0.8 K/UL    Basophils Absolute 0.0 0.0 - 0.2 K/UL    Absolute Immature Granulocyte 0.1 0.0 - 0.5 K/UL       Data Review OP note:  Operative Note        Patient: Tona Diallo  YOB: 1960  MRN: [id number]     Date of Procedure: 8/21/2022     Pre-Op Diagnosis: Incarcerated umbilical hernia     Post-Op Diagnosis: same       PROCEDURE LAPAROTOMY, Ventral hernia repair      Surgeon(s):  Bebeto Verde MD     Assistant:   * No surgical staff found *     Anesthesia: General     Estimated Blood Loss (mL): 50 cc     Complications: none      Specimens:   None      Implants:  None       Drains: * No LDAs found *     Findings: 3 contiguous ventral defects      Detailed Description of Procedure:   Taken to the OR, sedated, intubated and anesthetized   IV ancef  give. Abdomen prepped and draped. Mid line laparotomy incision performed   Small bowel reduced; bowel was viable, no resection needed. Hernia defect al together 12 cm. No mesh placed due to presence of murky fluid. Fascia closed with # 1 loop PDS. Skin approximated with staples. Radiology Results (most recent):    CT ABDOMEN PELVIS W IV CONTRAST Additional Contrast? None    Result Date: 8/21/2022  EXAM: CT ABDOMEN PELVIS W IV CONTRAST HISTORY: abd pain; vomiting. TECHNIQUE: Axial images of the abdomen and pelvis were performed following the administration of intravenous contrast. Images were obtained in the axial plane and coronal reformatted images were created and reviewed.  Dose reduction technique used: Automated exposure control/Adjustment of the mA and/or kV according to patient size/Use of iterative reconstruction technique. 100 mL of Isovue-370 were utilized. COMPARISON: 11/17/2019 FINDINGS: The visualized lung bases and mediastinum are unremarkable. The liver, spleen, pancreas, adrenal glands, gallbladder, and kidneys are within normal limits. Stable low-density lesion within the right kidney. Too small to characterize. The bladder appears grossly normal. Again there is a fibroid uterus. A structure felt to represent the left ovary is seen and appears within normal limits. A right ovary is not defined. Distal esophagus and stomach are unremarkable. The large bowel shows no evidence of obstruction. There is a normal appendix in the right lower quadrant. There is a small anterior abdominal wall hernia located to the right of the umbilicus which contains an incarcerated loop of small bowel. There is fecalization of the small bowel loops proximal to the hernia. No evidence of free fluid or free air. No pathologic adenopathy. No abdominal aortic aneurysm. Osseous structures show no evidence of acute fracture or suspicious lesion. There is an incarcerated loop of small bowel within an anterior abdominal wall hernia located to the right of the umbilicus. This is causing small bowel obstruction. There is no free air or free fluid. Fibroid uterus. Other findings as above. Assessment:     Principal Problem:    Incarcerated incisional hernia  Active Problems:    H/O umbilical hernia repair    Obesity, morbid (HCC)    SBO (small bowel obstruction) (HCC)    Hypertension    ELIZABET (obstructive sleep apnea)  Resolved Problems:    * No resolved hospital problems. *        Plan/Recommendations/Medical Decision Making:     Continue present treatment as discussed with DR. Abraham  Sips of clears only  per order DR. Abraham/IVF's for now  Ambulate in lacey TID PRN  Incentive spirometer use PRN  PT will follow up outpatient with Dr. Kiesha Mills as she is known to him from prior surgery in past.   Internal Medicine team following for management of meds,etc as has hx of HTN,Type 2 DM.    Monitor labs daily and replete electrolytes PRN    YVES Seals NP

## 2022-08-23 NOTE — PLAN OF CARE
Problem: Chronic Conditions and Co-morbidities  Goal: Patient's chronic conditions and co-morbidity symptoms are monitored and maintained or improved  Outcome: Progressing  Flowsheets (Taken 8/22/2022 1945)  Care Plan - Patient's Chronic Conditions and Co-Morbidity Symptoms are Monitored and Maintained or Improved: Monitor and assess patient's chronic conditions and comorbid symptoms for stability, deterioration, or improvement     Problem: Pain  Goal: Verbalizes/displays adequate comfort level or baseline comfort level  Outcome: Progressing     Problem: Discharge Planning  Goal: Discharge to home or other facility with appropriate resources  Outcome: Progressing  Flowsheets  Taken 8/23/2022 0416  Discharge to home or other facility with appropriate resources: Identify barriers to discharge with patient and caregiver  Taken 8/22/2022 1945  Discharge to home or other facility with appropriate resources: Identify barriers to discharge with patient and caregiver     Problem: ABCDS Injury Assessment  Goal: Absence of physical injury  Outcome: Progressing

## 2022-08-24 LAB
ANION GAP SERPL CALC-SCNC: 2 MMOL/L (ref 7–16)
BASOPHILS # BLD: 0 K/UL (ref 0–0.2)
BASOPHILS NFR BLD: 1 % (ref 0–2)
BUN SERPL-MCNC: 11 MG/DL (ref 8–23)
CALCIUM SERPL-MCNC: 8.8 MG/DL (ref 8.3–10.4)
CHLORIDE SERPL-SCNC: 111 MMOL/L (ref 98–107)
CO2 SERPL-SCNC: 26 MMOL/L (ref 21–32)
CREAT SERPL-MCNC: 0.58 MG/DL (ref 0.6–1)
DIFFERENTIAL METHOD BLD: ABNORMAL
EOSINOPHIL # BLD: 0.1 K/UL (ref 0–0.8)
EOSINOPHIL NFR BLD: 2 % (ref 0.5–7.8)
ERYTHROCYTE [DISTWIDTH] IN BLOOD BY AUTOMATED COUNT: 14.8 % (ref 11.9–14.6)
GLUCOSE SERPL-MCNC: 67 MG/DL (ref 65–100)
HCT VFR BLD AUTO: 35.7 % (ref 35.8–46.3)
HGB BLD-MCNC: 11.3 G/DL (ref 11.7–15.4)
IMM GRANULOCYTES # BLD AUTO: 0 K/UL (ref 0–0.5)
IMM GRANULOCYTES NFR BLD AUTO: 0 % (ref 0–5)
LYMPHOCYTES # BLD: 1.9 K/UL (ref 0.5–4.6)
LYMPHOCYTES NFR BLD: 31 % (ref 13–44)
MCH RBC QN AUTO: 26.9 PG (ref 26.1–32.9)
MCHC RBC AUTO-ENTMCNC: 31.7 G/DL (ref 31.4–35)
MCV RBC AUTO: 85 FL (ref 79.6–97.8)
MONOCYTES # BLD: 0.5 K/UL (ref 0.1–1.3)
MONOCYTES NFR BLD: 8 % (ref 4–12)
NEUTS SEG # BLD: 3.6 K/UL (ref 1.7–8.2)
NEUTS SEG NFR BLD: 58 % (ref 43–78)
NRBC # BLD: 0 K/UL (ref 0–0.2)
PLATELET # BLD AUTO: 243 K/UL (ref 150–450)
PMV BLD AUTO: 8.9 FL (ref 9.4–12.3)
POTASSIUM SERPL-SCNC: 3.7 MMOL/L (ref 3.5–5.1)
RBC # BLD AUTO: 4.2 M/UL (ref 4.05–5.2)
SODIUM SERPL-SCNC: 139 MMOL/L (ref 136–145)
WBC # BLD AUTO: 6.1 K/UL (ref 4.3–11.1)

## 2022-08-24 PROCEDURE — 85025 COMPLETE CBC W/AUTO DIFF WBC: CPT

## 2022-08-24 PROCEDURE — 6360000002 HC RX W HCPCS: Performed by: NURSE PRACTITIONER

## 2022-08-24 PROCEDURE — 80048 BASIC METABOLIC PNL TOTAL CA: CPT

## 2022-08-24 PROCEDURE — 1100000000 HC RM PRIVATE

## 2022-08-24 PROCEDURE — 36415 COLL VENOUS BLD VENIPUNCTURE: CPT

## 2022-08-24 PROCEDURE — 2500000003 HC RX 250 WO HCPCS: Performed by: NURSE PRACTITIONER

## 2022-08-24 PROCEDURE — 2580000003 HC RX 258: Performed by: NURSE PRACTITIONER

## 2022-08-24 RX ORDER — OXYCODONE HYDROCHLORIDE AND ACETAMINOPHEN 5; 325 MG/1; MG/1
1 TABLET ORAL EVERY 6 HOURS PRN
Status: DISCONTINUED | OUTPATIENT
Start: 2022-08-24 | End: 2022-08-25 | Stop reason: HOSPADM

## 2022-08-24 RX ADMIN — SODIUM CHLORIDE, PRESERVATIVE FREE 20 MG: 5 INJECTION INTRAVENOUS at 08:17

## 2022-08-24 RX ADMIN — ENOXAPARIN SODIUM 40 MG: 100 INJECTION SUBCUTANEOUS at 08:20

## 2022-08-24 RX ADMIN — SODIUM CHLORIDE, PRESERVATIVE FREE 10 ML: 5 INJECTION INTRAVENOUS at 21:41

## 2022-08-24 RX ADMIN — SODIUM CHLORIDE, PRESERVATIVE FREE 20 MG: 5 INJECTION INTRAVENOUS at 21:41

## 2022-08-24 ASSESSMENT — PAIN SCALES - GENERAL: PAINLEVEL_OUTOF10: 2

## 2022-08-24 NOTE — CARE COORDINATION
RN CM met with the patient and her visitor at the bedside and discussed discharge planning. She reports she is independent of ADLs. No case management needs are anticipated at this time. Case Management will continue to follow her for discharge planning needs.

## 2022-08-24 NOTE — PLAN OF CARE
Problem: Skin/Tissue Integrity - Adult  Goal: Skin integrity remains intact  Outcome: Progressing  Goal: Incisions, wounds, or drain sites healing without S/S of infection  Outcome: Progressing  Goal: Oral mucous membranes remain intact  Outcome: Progressing     Problem: ABCDS Injury Assessment  Goal: Absence of physical injury  8/24/2022 0103 by Jerica Jiang RN  Outcome: Progressing  8/24/2022 0102 by Jerica Jiang RN  Outcome: Progressing  Flowsheets (Taken 8/23/2022 2010)  Absence of Physical Injury: Implement safety measures based on patient assessment     Problem: Discharge Planning  Goal: Discharge to home or other facility with appropriate resources  8/24/2022 0103 by Jerica Jiang RN  Outcome: Progressing  8/24/2022 0102 by Jerica Jiang RN  Outcome: Progressing  Flowsheets (Taken 8/23/2022 2010)  Discharge to home or other facility with appropriate resources: Identify barriers to discharge with patient and caregiver     Problem: Chronic Conditions and Co-morbidities  Goal: Patient's chronic conditions and co-morbidity symptoms are monitored and maintained or improved  8/24/2022 0103 by Jerica Jiang RN  Outcome: Progressing  8/24/2022 0102 by Jerica Jiang RN  Outcome: Progressing  Flowsheets (Taken 8/23/2022 2010)  Care Plan - Patient's Chronic Conditions and Co-Morbidity Symptoms are Monitored and Maintained or Improved: Monitor and assess patient's chronic conditions and comorbid symptoms for stability, deterioration, or improvement

## 2022-08-24 NOTE — PROGRESS NOTES
Chart reviewed. Chronic med conditions stable. Hospitalist will sign off. Please call if needed. No charge to patient.

## 2022-08-24 NOTE — PROGRESS NOTES
Admit Date: 2022    POD 3 Days Post-Op    HPI: Radah Unger is a 60 y.o. female who Presented to the ER with 12 hours of abdominal pain, nausea and vomiting on 2022. She had a previous h/o umbilical hernia repair in 2019 done per DR. Haney.  CT done on admission showed a new incarcerated hernia causing a SBO  PT was taken to OR per Dr. Yovanny Fair 2022 at 11:27 pm.        Procedure:  Procedure(s):  Ventral hernia repair done 2022 per Dr. Yovanny Fair    Subjective:     Patient has complaints of pain at incisional area to midline. PT reports pain 2 on scale 1 out of 10. Pt denies any nausea or vomiting. Pt reports + Flatus but no BM at this time. Diet will be advanced today  per Dr. Sklya Acuna. Patient has been out of bed and ambulated in lacey. Pt will need to f/u with Dr. Josh Lutz for post op check once discharged as she is known to him. Objective:       Vitals:    22 1553 22 1936 22 2241 22 0324   BP: 122/66 132/66 (!) 143/69 132/76   Pulse: 64 60 63 65   Resp:   19 18   Temp: 97.7 °F (36.5 °C) 98.2 °F (36.8 °C) 98.1 °F (36.7 °C) 97.9 °F (36.6 °C)   TempSrc: Oral Oral  Oral   SpO2: 96% 97% 96% 95%   Weight:       Height:           Temp (24hrs), Av.8 °F (36.6 °C), Min:97.2 °F (36.2 °C), Max:98.2 °F (36.8 °C)  . I&O reviewed as documented. Intake/Output Summary (Last 24 hours) at 2022 0742  Last data filed at 2022 0553  Gross per 24 hour   Intake 1914 ml   Output --   Net 1914 ml        ROS: The patient has no difficulty with chest pain or shortness of breath. No fever or chills. Comprehensive review of systems was otherwise unremarkable except as noted above. Physical Exam:     Physical Exam  Vitals and nursing note reviewed. Constitutional:       Appearance: Normal appearance. HENT:      Head: Normocephalic.       Nose: Nose normal.      Mouth/Throat:      Mouth: Mucous membranes are moist.   Eyes:      Pupils: Pupils are equal, round, and reactive to light. Cardiovascular:      Rate and Rhythm: Normal rate and regular rhythm. Pulses: Normal pulses. Heart sounds: Normal heart sounds. Pulmonary:      Effort: Pulmonary effort is normal.      Breath sounds: Normal breath sounds. Abdominal:      Palpations: Abdomen is soft. Comments: Appropriate tenderness noted to midline incision. Dressing clean/dry and intact to midline. Hypoactive BS noted. Staples clean/dry/intact to midline with no drainage or erythema. Musculoskeletal:         General: Normal range of motion. Cervical back: Normal range of motion. Skin:     General: Skin is warm. Capillary Refill: Capillary refill takes less than 2 seconds. Neurological:      General: No focal deficit present. Mental Status: She is alert and oriented to person, place, and time.    Psychiatric:         Mood and Affect: Mood normal.         Behavior: Behavior normal.          Labs:   Recent Results (from the past 24 hour(s))   CBC with Auto Differential    Collection Time: 08/24/22  5:15 AM   Result Value Ref Range    WBC 6.1 4.3 - 11.1 K/uL    RBC 4.20 4.05 - 5.2 M/uL    Hemoglobin 11.3 (L) 11.7 - 15.4 g/dL    Hematocrit 35.7 (L) 35.8 - 46.3 %    MCV 85.0 79.6 - 97.8 FL    MCH 26.9 26.1 - 32.9 PG    MCHC 31.7 31.4 - 35.0 g/dL    RDW 14.8 (H) 11.9 - 14.6 %    Platelets 329 482 - 410 K/uL    MPV 8.9 (L) 9.4 - 12.3 FL    nRBC 0.00 0.0 - 0.2 K/uL    Differential Type AUTOMATED      Seg Neutrophils 58 43 - 78 %    Lymphocytes 31 13 - 44 %    Monocytes 8 4.0 - 12.0 %    Eosinophils % 2 0.5 - 7.8 %    Basophils 1 0.0 - 2.0 %    Immature Granulocytes 0 0.0 - 5.0 %    Segs Absolute 3.6 1.7 - 8.2 K/UL    Absolute Lymph # 1.9 0.5 - 4.6 K/UL    Absolute Mono # 0.5 0.1 - 1.3 K/UL    Absolute Eos # 0.1 0.0 - 0.8 K/UL    Basophils Absolute 0.0 0.0 - 0.2 K/UL    Absolute Immature Granulocyte 0.0 0.0 - 0.5 K/UL   Basic Metabolic Panel    Collection Time: 08/24/22  5:15 AM   Result Value Ref Range Sodium 139 136 - 145 mmol/L    Potassium 3.7 3.5 - 5.1 mmol/L    Chloride 111 (H) 98 - 107 mmol/L    CO2 26 21 - 32 mmol/L    Anion Gap 2 (L) 7 - 16 mmol/L    Glucose 67 65 - 100 mg/dL    BUN 11 8 - 23 MG/DL    Creatinine 0.58 (L) 0.6 - 1.0 MG/DL    GFR African American >60 >60 ml/min/1.73m2    GFR Non- >60 >60 ml/min/1.73m2    Calcium 8.8 8.3 - 10.4 MG/DL       Data Review OP note:  Operative Note        Patient: Araceli Lanier  YOB: 1960  MRN: [id number]     Date of Procedure: 8/21/2022     Pre-Op Diagnosis: Incarcerated umbilical hernia     Post-Op Diagnosis: same       PROCEDURE LAPAROTOMY, Ventral hernia repair      Surgeon(s):  Adore Christiansen MD     Assistant:   * No surgical staff found *     Anesthesia: General     Estimated Blood Loss (mL): 50 cc     Complications: none      Specimens:   None      Implants:  None       Drains: * No LDAs found *     Findings: 3 contiguous ventral defects      Detailed Description of Procedure:   Taken to the OR, sedated, intubated and anesthetized   IV ancef  give. Abdomen prepped and draped. Mid line laparotomy incision performed   Small bowel reduced; bowel was viable, no resection needed. Hernia defect al together 12 cm. No mesh placed due to presence of murky fluid. Fascia closed with # 1 loop PDS. Skin approximated with staples. Radiology Results (most recent):    CT ABDOMEN PELVIS W IV CONTRAST Additional Contrast? None    Result Date: 8/21/2022  EXAM: CT ABDOMEN PELVIS W IV CONTRAST HISTORY: abd pain; vomiting. TECHNIQUE: Axial images of the abdomen and pelvis were performed following the administration of intravenous contrast. Images were obtained in the axial plane and coronal reformatted images were created and reviewed. Dose reduction technique used: Automated exposure control/Adjustment of the mA and/or kV according to patient size/Use of iterative reconstruction technique. 100 mL of Isovue-370 were utilized. COMPARISON: 11/17/2019 FINDINGS: The visualized lung bases and mediastinum are unremarkable. The liver, spleen, pancreas, adrenal glands, gallbladder, and kidneys are within normal limits. Stable low-density lesion within the right kidney. Too small to characterize. The bladder appears grossly normal. Again there is a fibroid uterus. A structure felt to represent the left ovary is seen and appears within normal limits. A right ovary is not defined. Distal esophagus and stomach are unremarkable. The large bowel shows no evidence of obstruction. There is a normal appendix in the right lower quadrant. There is a small anterior abdominal wall hernia located to the right of the umbilicus which contains an incarcerated loop of small bowel. There is fecalization of the small bowel loops proximal to the hernia. No evidence of free fluid or free air. No pathologic adenopathy. No abdominal aortic aneurysm. Osseous structures show no evidence of acute fracture or suspicious lesion. There is an incarcerated loop of small bowel within an anterior abdominal wall hernia located to the right of the umbilicus. This is causing small bowel obstruction. There is no free air or free fluid. Fibroid uterus. Other findings as above. Assessment:     Principal Problem:    Incarcerated incisional hernia  Active Problems:    H/O umbilical hernia repair    Obesity, morbid (HCC)    SBO (small bowel obstruction) (HCC)    Hypertension    ELIZABET (obstructive sleep apnea)  Resolved Problems:    * No resolved hospital problems. *        Plan/Recommendations/Medical Decision Making:     -Continue present treatment as discussed with DR. Abraham  -Clear liquid diet and then advance diet as tolerated  -If patient tolerates diet, may discharge home within next 24 hours.   -Ambulate in lacey TID PRN  -Incentive spirometer use PRN  -PT will follow up outpatient with Dr. Andrew Knapp as she is known to him from prior surgery in past.   -Internal Medicine team following for management of meds,etc as has hx of HTN,Type 2 DM.    -Monitor labs daily and replete electrolytes PRN  -Make fu appt with Dr Pj Mackenzie office for post op check up     YVES Begum - NP

## 2022-08-25 VITALS
HEART RATE: 58 BPM | RESPIRATION RATE: 17 BRPM | TEMPERATURE: 97.6 F | WEIGHT: 172 LBS | OXYGEN SATURATION: 95 % | SYSTOLIC BLOOD PRESSURE: 123 MMHG | DIASTOLIC BLOOD PRESSURE: 73 MMHG | BODY MASS INDEX: 32.47 KG/M2 | HEIGHT: 61 IN

## 2022-08-25 LAB
ANION GAP SERPL CALC-SCNC: 4 MMOL/L (ref 7–16)
BASOPHILS # BLD: 0 K/UL (ref 0–0.2)
BASOPHILS NFR BLD: 1 % (ref 0–2)
BUN SERPL-MCNC: 8 MG/DL (ref 8–23)
CALCIUM SERPL-MCNC: 8.7 MG/DL (ref 8.3–10.4)
CHLORIDE SERPL-SCNC: 109 MMOL/L (ref 98–107)
CO2 SERPL-SCNC: 28 MMOL/L (ref 21–32)
CREAT SERPL-MCNC: 0.52 MG/DL (ref 0.6–1)
DIFFERENTIAL METHOD BLD: ABNORMAL
EOSINOPHIL # BLD: 0.2 K/UL (ref 0–0.8)
EOSINOPHIL NFR BLD: 3 % (ref 0.5–7.8)
ERYTHROCYTE [DISTWIDTH] IN BLOOD BY AUTOMATED COUNT: 14.6 % (ref 11.9–14.6)
GLUCOSE SERPL-MCNC: 80 MG/DL (ref 65–100)
HCT VFR BLD AUTO: 34.4 % (ref 35.8–46.3)
HGB BLD-MCNC: 11.1 G/DL (ref 11.7–15.4)
IMM GRANULOCYTES # BLD AUTO: 0 K/UL (ref 0–0.5)
IMM GRANULOCYTES NFR BLD AUTO: 0 % (ref 0–5)
LYMPHOCYTES # BLD: 1.5 K/UL (ref 0.5–4.6)
LYMPHOCYTES NFR BLD: 32 % (ref 13–44)
MCH RBC QN AUTO: 26.7 PG (ref 26.1–32.9)
MCHC RBC AUTO-ENTMCNC: 32.3 G/DL (ref 31.4–35)
MCV RBC AUTO: 82.7 FL (ref 79.6–97.8)
MONOCYTES # BLD: 0.4 K/UL (ref 0.1–1.3)
MONOCYTES NFR BLD: 8 % (ref 4–12)
NEUTS SEG # BLD: 2.7 K/UL (ref 1.7–8.2)
NEUTS SEG NFR BLD: 56 % (ref 43–78)
NRBC # BLD: 0 K/UL (ref 0–0.2)
PLATELET # BLD AUTO: 255 K/UL (ref 150–450)
PMV BLD AUTO: 8.6 FL (ref 9.4–12.3)
POTASSIUM SERPL-SCNC: 3.5 MMOL/L (ref 3.5–5.1)
RBC # BLD AUTO: 4.16 M/UL (ref 4.05–5.2)
SODIUM SERPL-SCNC: 141 MMOL/L (ref 136–145)
WBC # BLD AUTO: 4.8 K/UL (ref 4.3–11.1)

## 2022-08-25 PROCEDURE — 85025 COMPLETE CBC W/AUTO DIFF WBC: CPT

## 2022-08-25 PROCEDURE — 6360000002 HC RX W HCPCS: Performed by: NURSE PRACTITIONER

## 2022-08-25 PROCEDURE — 36415 COLL VENOUS BLD VENIPUNCTURE: CPT

## 2022-08-25 PROCEDURE — 80048 BASIC METABOLIC PNL TOTAL CA: CPT

## 2022-08-25 PROCEDURE — 2580000003 HC RX 258: Performed by: NURSE PRACTITIONER

## 2022-08-25 PROCEDURE — 2500000003 HC RX 250 WO HCPCS: Performed by: NURSE PRACTITIONER

## 2022-08-25 RX ORDER — ACETAMINOPHEN 500 MG
500 TABLET ORAL 4 TIMES DAILY PRN
Qty: 1 TABLET | Refills: 0
Start: 2022-08-25

## 2022-08-25 RX ADMIN — ENOXAPARIN SODIUM 40 MG: 100 INJECTION SUBCUTANEOUS at 08:02

## 2022-08-25 RX ADMIN — SODIUM CHLORIDE, PRESERVATIVE FREE 20 MG: 5 INJECTION INTRAVENOUS at 08:02

## 2022-08-25 RX ADMIN — SODIUM CHLORIDE, PRESERVATIVE FREE 10 ML: 5 INJECTION INTRAVENOUS at 08:02

## 2022-08-25 NOTE — DISCHARGE SUMMARY
Dressings/Wound Care  Leave dressings 5-6 days. Then remove, but keep incision(s) covered daily until follow-up. Try to keep incision(s) as dry as possible to lower risk of infection. Activity  No heavy lifting (>5lbs) for 6 weeks to reduce risk of developing a hernia in the incisions.       Pain prescription (Norco) electronically sent to your pharmacy  Follow-up with Shahriar Olivarez Nurse Practitioner 8-10 days in the office at:  [address], [address]  (Call for an appt time unless one is already made for you by discharge nurse which is preferred->[phone])    Diet  Then Soft diabetic diet until follow-up

## 2022-08-25 NOTE — PROGRESS NOTES
Admit Date: 2022    POD 4 Days Post-Op    HPI: Ney Coffman is a 60 y.o. female who Presented to the ER with 12 hours of abdominal pain, nausea and vomiting on 2022. She had a previous h/o umbilical hernia repair in 2019 done per DR. Haney.  CT done on admission showed a new incarcerated hernia causing a SBO  PT was taken to OR per Dr. Leroy Butt 2022 at 11:27 pm.        Procedure:  Procedure(s):  Ventral hernia repair done 2022 per Dr. Meet Crow:     Patient has complaints of pain at incisional area to midline as expected.   + Flatus but no BM at this time. Tolerating Diabetic 3 carb diet. Patient has been out of bed and ambulated in lacey. Pt will need to f/u with Dr. Pj Mackenzie for post op check once discharged as she is known to him. Objective:       Vitals:    22 2301 22 0535 22 0710   BP: 127/66 (!) 142/72 114/68 123/73   Pulse: 66 63 65 58   Resp: 16 16 16 17   Temp: 97.5 °F (36.4 °C) 98.2 °F (36.8 °C) 98.2 °F (36.8 °C) 97.6 °F (36.4 °C)   TempSrc: Oral Oral Oral Oral   SpO2: 97% 97% 94% 95%   Weight:       Height:           Temp (24hrs), Av.7 °F (36.5 °C), Min:97.3 °F (36.3 °C), Max:98.2 °F (36.8 °C)  . I&O reviewed as documented. Intake/Output Summary (Last 24 hours) at 2022 0943  Last data filed at 2022 1900  Gross per 24 hour   Intake 320 ml   Output --   Net 320 ml        ROS: The patient has no difficulty with chest pain or shortness of breath. No fever or chills. Comprehensive review of systems was otherwise unremarkable except as noted above. Physical Exam:     Physical Exam  Vitals and nursing note reviewed. Constitutional:       Appearance: Normal appearance. HENT:      Head: Normocephalic. Nose: Nose normal.      Mouth/Throat:      Mouth: Mucous membranes are moist.   Eyes:      Pupils: Pupils are equal, round, and reactive to light.    Cardiovascular:      Rate and Rhythm: Normal rate and regular rhythm. Pulses: Normal pulses. Heart sounds: Normal heart sounds. Pulmonary:      Effort: Pulmonary effort is normal.      Breath sounds: Normal breath sounds. Abdominal:      Palpations: Abdomen is soft. Comments: Appropriate tenderness noted to midline incision. Dressing clean/dry and intact to midline. BS noted. Staples clean/dry/intact to midline with no drainage or erythema. Musculoskeletal:         General: Normal range of motion. Cervical back: Normal range of motion. Skin:     General: Skin is warm. Capillary Refill: Capillary refill takes less than 2 seconds. Neurological:      General: No focal deficit present. Mental Status: She is alert and oriented to person, place, and time.    Psychiatric:         Mood and Affect: Mood normal.         Behavior: Behavior normal.          Labs:   Recent Results (from the past 24 hour(s))   CBC with Auto Differential    Collection Time: 08/25/22  5:25 AM   Result Value Ref Range    WBC 4.8 4.3 - 11.1 K/uL    RBC 4.16 4.05 - 5.2 M/uL    Hemoglobin 11.1 (L) 11.7 - 15.4 g/dL    Hematocrit 34.4 (L) 35.8 - 46.3 %    MCV 82.7 79.6 - 97.8 FL    MCH 26.7 26.1 - 32.9 PG    MCHC 32.3 31.4 - 35.0 g/dL    RDW 14.6 11.9 - 14.6 %    Platelets 189 135 - 382 K/uL    MPV 8.6 (L) 9.4 - 12.3 FL    nRBC 0.00 0.0 - 0.2 K/uL    Differential Type AUTOMATED      Seg Neutrophils 56 43 - 78 %    Lymphocytes 32 13 - 44 %    Monocytes 8 4.0 - 12.0 %    Eosinophils % 3 0.5 - 7.8 %    Basophils 1 0.0 - 2.0 %    Immature Granulocytes 0 0.0 - 5.0 %    Segs Absolute 2.7 1.7 - 8.2 K/UL    Absolute Lymph # 1.5 0.5 - 4.6 K/UL    Absolute Mono # 0.4 0.1 - 1.3 K/UL    Absolute Eos # 0.2 0.0 - 0.8 K/UL    Basophils Absolute 0.0 0.0 - 0.2 K/UL    Absolute Immature Granulocyte 0.0 0.0 - 0.5 K/UL   Basic Metabolic Panel    Collection Time: 08/25/22  5:25 AM   Result Value Ref Range    Sodium 141 136 - 145 mmol/L    Potassium 3.5 3.5 - 5.1 mmol/L    Chloride 109 (H) 98 - 107 mmol/L    CO2 28 21 - 32 mmol/L    Anion Gap 4 (L) 7 - 16 mmol/L    Glucose 80 65 - 100 mg/dL    BUN 8 8 - 23 MG/DL    Creatinine 0.52 (L) 0.6 - 1.0 MG/DL    GFR African American >60 >60 ml/min/1.73m2    GFR Non- >60 >60 ml/min/1.73m2    Calcium 8.7 8.3 - 10.4 MG/DL       Data Review OP note:  Operative Note        Patient: Lindsey Hernández  YOB: 1960  MRN: [id number]     Date of Procedure: 8/21/2022     Pre-Op Diagnosis: Incarcerated umbilical hernia     Post-Op Diagnosis: same       PROCEDURE LAPAROTOMY, Ventral hernia repair      Surgeon(s):  Saurav Doyle MD     Assistant:   * No surgical staff found *     Anesthesia: General     Estimated Blood Loss (mL): 50 cc     Complications: none      Specimens:   None      Implants:  None       Drains: * No LDAs found *     Findings: 3 contiguous ventral defects      Detailed Description of Procedure:   Taken to the OR, sedated, intubated and anesthetized   IV ancef  give. Abdomen prepped and draped. Mid line laparotomy incision performed   Small bowel reduced; bowel was viable, no resection needed. Hernia defect al together 12 cm. No mesh placed due to presence of murky fluid. Fascia closed with # 1 loop PDS. Skin approximated with staples. Radiology Results (most recent):    CT ABDOMEN PELVIS W IV CONTRAST Additional Contrast? None    Result Date: 8/21/2022  EXAM: CT ABDOMEN PELVIS W IV CONTRAST HISTORY: abd pain; vomiting. TECHNIQUE: Axial images of the abdomen and pelvis were performed following the administration of intravenous contrast. Images were obtained in the axial plane and coronal reformatted images were created and reviewed. Dose reduction technique used: Automated exposure control/Adjustment of the mA and/or kV according to patient size/Use of iterative reconstruction technique. 100 mL of Isovue-370 were utilized. COMPARISON: 11/17/2019 FINDINGS: The visualized lung bases and mediastinum are unremarkable.  The liver, spleen, pancreas, adrenal glands, gallbladder, and kidneys are within normal limits. Stable low-density lesion within the right kidney. Too small to characterize. The bladder appears grossly normal. Again there is a fibroid uterus. A structure felt to represent the left ovary is seen and appears within normal limits. A right ovary is not defined. Distal esophagus and stomach are unremarkable. The large bowel shows no evidence of obstruction. There is a normal appendix in the right lower quadrant. There is a small anterior abdominal wall hernia located to the right of the umbilicus which contains an incarcerated loop of small bowel. There is fecalization of the small bowel loops proximal to the hernia. No evidence of free fluid or free air. No pathologic adenopathy. No abdominal aortic aneurysm. Osseous structures show no evidence of acute fracture or suspicious lesion. There is an incarcerated loop of small bowel within an anterior abdominal wall hernia located to the right of the umbilicus. This is causing small bowel obstruction. There is no free air or free fluid. Fibroid uterus. Other findings as above. Assessment:     Principal Problem:    Incarcerated incisional hernia  Active Problems:    H/O umbilical hernia repair    Obesity, morbid (HCC)    SBO (small bowel obstruction) (HCC)    Hypertension    ELIZABET (obstructive sleep apnea)  Resolved Problems:    * No resolved hospital problems. *        Plan/Recommendations/Medical Decision Making:     DC- pt did NOT want pain Rx as she is not taking any pain meds. She will use OTC Tylenol or motrin for any discomfort. She agreed with plan.    -DM diet at home as tolerated  -Ambulate at home  -Incentive spirometer use at home  -PT will follow up with me for suture removal per Dr Chuck Mario who has seen her in the past.     Rona Cheadle, YVES - CNP

## 2022-08-25 NOTE — DISCHARGE SUMMARY
[address]  [phone]   Discharge Summary     Zuri Garcia  MRN: [id number]     : 1960     Age: 60 y.o. Admit date: 2022     Discharge date: 2022  Attending Physician: Torri Collier MD  Primary Discharge Diagnosis:   Principal Problem:    Incarcerated incisional hernia  Active Problems:    H/O umbilical hernia repair    Obesity, morbid ([address]    SBO (small bowel obstruction) ([address]    Hypertension    ELIZABET (obstructive sleep apnea)  Resolved Problems:    * No resolved hospital problems. *    Primary Operations or Procedures Performed :  Procedure(s): HERNIA UMBILICAL REPAIR     Brief History and Reason for Admission: Zrui Garcia was admitted with the following history of present illness:  Zuri Garcia is a 60 y.o. female who   Presents to the ER with 12 hours of abdominal pain, nausea and vomiting. She had a previous h/o umbilical hernia repair in 2019. CT showed a new incarcerated hernia causing a SBO            Hospital Course: The patient underwent Procedure(s): HERNIA UMBILICAL REPAIR on         The patient progressed satisfactorily meeting milestones necessary for successful discharge including tolerating a diet, adequate mobility, adequate pain control, and active bowel function. Patient was deemed a good candidate for discharge at the time of morning rounding. They are to follow up as indicated in their provided discharge paperwork. The patient helped develop and voices understanding with the plan of care. They are amenable without reservations at this time to moving forward with discharge. Condition at Discharge: Good    Discharge Medications:   Current Discharge Medication List        START taking these medications    Details   acetaminophen (TYLENOL) 500 MG tablet Take 1 tablet by mouth 4 times daily as needed for Pain OVER the counter medication.   Take as on label for

## 2022-08-25 NOTE — CARE COORDINATION
08/25/22 1034   Service Assessment   Patient Orientation Alert and [address] Spouse/Significant Other   Prior Functional Level Independent in ADLs/IADLs   Current Functional Level Independent in ADLs/IADLs   Can patient return to prior living arrangement Yes   Social/Functional History   Lives With Spouse   Type of Home House   ADL Assistance Independent   Discharge Planning   Type of Residence House   Living Arrangements Spouse/Significant Other   Current Services Prior To Admission None   Potential Assistance Needed N/A   DME Ordered? No   Potential Assistance Purchasing Medications No   Type of Home Care Services None   Patient expects to be discharged to: [address] [facility]   Services At/After Discharge None     The patient is discharging home with her spouse. No case management needs were identified.

## 2022-08-25 NOTE — PLAN OF CARE
Problem: Chronic Conditions and Co-morbidities  Goal: Patient's chronic conditions and co-morbidity symptoms are monitored and maintained or improved  8/24/2022 2322 by Sameera Alves RN  Outcome: Adequate for Discharge  8/24/2022 2322 by Sameera Alves RN  Outcome: Progressing  Flowsheets (Taken 8/24/2022 1915)  Care Plan - Patient's Chronic Conditions and Co-Morbidity Symptoms are Monitored and Maintained or Improved: Monitor and assess patient's chronic conditions and comorbid symptoms for stability, deterioration, or improvement     Problem: Pain  Goal: Verbalizes/displays adequate comfort level or baseline comfort level  8/24/2022 2322 by Sameera Alves RN  Outcome: Adequate for Discharge  8/24/2022 2322 by Sameera Alves RN  Outcome: Progressing     Problem: Discharge Planning  Goal: Discharge to home or other facility with appropriate resources  8/24/2022 2322 by Sameera Alves RN  Outcome: Adequate for Discharge  8/24/2022 2322 by Samerea Alves RN  Outcome: Progressing  Flowsheets (Taken 8/24/2022 1915)  Discharge to home or other facility with appropriate resources: Identify barriers to discharge with patient and caregiver     Problem: ABCDS Injury Assessment  Goal: Absence of physical injury  8/24/2022 2322 by Sameera Alves RN  Outcome: Adequate for Discharge  8/24/2022 2322 by Sameera Alves RN  Outcome: Progressing  Flowsheets (Taken 8/24/2022 1915)  Absence of Physical Injury: Implement safety measures based on patient assessment     Problem: Skin/Tissue Integrity - Adult  Goal: Skin integrity remains intact  8/24/2022 2322 by Sameera Alves RN  Outcome: Adequate for Discharge  8/24/2022 2322 by Sameera Alves RN  Outcome: Progressing  Goal: Incisions, wounds, or drain sites healing without S/S of infection  8/24/2022 2322 by Sameera Alves RN  Outcome: Adequate for Discharge  8/24/2022 2322 by Sameera Alves RN  Outcome: Progressing  Goal: Oral mucous membranes remain intact  8/24/2022 2322 by Esperanza Murray RN  Outcome: Adequate for Discharge  8/24/2022 2322 by Esperanza Murray RN  Outcome: Progressing     Problem: Skin/Tissue Integrity - Adult  Goal: Incisions, wounds, or drain sites healing without S/S of infection  8/24/2022 2322 by Esperanza Murray RN  Outcome: Adequate for Discharge  8/24/2022 2322 by Esperanza Murray RN  Outcome: Progressing     Problem: Skin/Tissue Integrity - Adult  Goal: Oral mucous membranes remain intact  8/24/2022 2322 by Esperanza Murray RN  Outcome: Adequate for Discharge  8/24/2022 2322 by Esperanza Murray RN  Outcome: Progressing

## 2022-08-25 NOTE — DISCHARGE INSTRUCTIONS
Dressings/Wound Care  Leave dressings 5-6 days. Then remove, but keep incision(s) covered daily until follow-up. Try to keep incision(s) as dry as possible to lower risk of infection. Activity  No heavy lifting (>5lbs) for 6 weeks to reduce risk of developing a hernia in the incisions. No driving until you are off pain meds for 24hrs and have no pain with movements associated with driving.     Pain prescription (Norco) electronically sent to your pharmacy  Follow-up with Nurse Practitioner for suture removal in 8-10 days in the office at:  [address], [address]  (Call for an appt time unless one is already made for you by discharge nurse which is preferred->[phone])    Diet  Diabetic Soft diet until follow-up     Constipation: may take OTC Colace or Senakot as needed

## 2022-09-07 ENCOUNTER — OFFICE VISIT (OUTPATIENT)
Dept: SURGERY | Age: 62
End: 2022-09-07

## 2022-09-07 VITALS
SYSTOLIC BLOOD PRESSURE: 144 MMHG | DIASTOLIC BLOOD PRESSURE: 86 MMHG | HEIGHT: 61 IN | BODY MASS INDEX: 32.47 KG/M2 | OXYGEN SATURATION: 96 % | WEIGHT: 172 LBS | HEART RATE: 73 BPM

## 2022-09-07 DIAGNOSIS — Z98.890 H/O UMBILICAL HERNIA REPAIR: Primary | ICD-10-CM

## 2022-09-07 DIAGNOSIS — Z87.19 H/O UMBILICAL HERNIA REPAIR: Primary | ICD-10-CM

## 2022-09-08 NOTE — PROGRESS NOTES
[facility]  [address] [place] [address], [address] [place] [address]  [phone]      Jama Delcid   presents for follow-up after repeat Incarcerated umbilical hernia s/p   LAPAROTOMY, Ventral hernia repair 8/21/22. by Dr Raimundo Ingram. She reports no problems with eating, bowel movements, voiding, or their wound and no ongoing postoperative problems. EXAM:  She  is in no distress  There is no residual tenderness in the abdominal  region  Incision: healing well, no seroma, no cellulitis. Staples removed, steri strips applied. ASSESSMENT/PLAN:    1. H/O umbilical hernia repair         Diet as tolerated  No heavy lifting for 6 weeks  Recommended Abdominal binder for 6 weeks for additional support. Follow up here prn  Continued weight loss encouraged to reduce possibility of recurrence. No follow-up provider specified.     Domingo De Los Santos, APRN - CNP

## 2024-12-02 ENCOUNTER — HOSPITAL ENCOUNTER (EMERGENCY)
Age: 64
Discharge: HOME | End: 2024-12-02
Attending: EMERGENCY MEDICINE
Payer: COMMERCIAL

## 2024-12-02 ENCOUNTER — APPOINTMENT (OUTPATIENT)
Dept: GENERAL RADIOLOGY | Age: 64
End: 2024-12-02
Payer: COMMERCIAL

## 2024-12-02 VITALS
DIASTOLIC BLOOD PRESSURE: 67 MMHG | OXYGEN SATURATION: 98 % | RESPIRATION RATE: 19 BRPM | HEART RATE: 66 BPM | WEIGHT: 168 LBS | SYSTOLIC BLOOD PRESSURE: 114 MMHG | TEMPERATURE: 98.7 F | HEIGHT: 61 IN | BODY MASS INDEX: 31.72 KG/M2

## 2024-12-02 DIAGNOSIS — R07.89 ATYPICAL CHEST PAIN: ICD-10-CM

## 2024-12-02 DIAGNOSIS — R07.9 ACUTE CHEST PAIN: Primary | ICD-10-CM

## 2024-12-02 LAB
ALBUMIN SERPL-MCNC: 3.5 G/DL (ref 3.2–4.6)
ALBUMIN/GLOB SERPL: 1 (ref 1–1.9)
ALP SERPL-CCNC: 54 U/L (ref 35–104)
ALT SERPL-CCNC: 14 U/L (ref 8–45)
ANION GAP SERPL CALC-SCNC: 10 MMOL/L (ref 7–16)
AST SERPL-CCNC: 18 U/L (ref 15–37)
BASOPHILS # BLD: 0.1 K/UL (ref 0–0.2)
BASOPHILS NFR BLD: 1 % (ref 0–2)
BILIRUB SERPL-MCNC: 0.6 MG/DL (ref 0–1.2)
BUN SERPL-MCNC: 9 MG/DL (ref 8–23)
CALCIUM SERPL-MCNC: 9.5 MG/DL (ref 8.8–10.2)
CHLORIDE SERPL-SCNC: 105 MMOL/L (ref 98–107)
CO2 SERPL-SCNC: 26 MMOL/L (ref 20–29)
CREAT SERPL-MCNC: 0.8 MG/DL (ref 0.6–1.1)
DIFFERENTIAL METHOD BLD: ABNORMAL
EKG ATRIAL RATE: 78 BPM
EKG DIAGNOSIS: NORMAL
EKG P AXIS: 64 DEGREES
EKG P-R INTERVAL: 156 MS
EKG Q-T INTERVAL: 370 MS
EKG QRS DURATION: 79 MS
EKG QTC CALCULATION (BAZETT): 422 MS
EKG R AXIS: 37 DEGREES
EKG T AXIS: 30 DEGREES
EKG VENTRICULAR RATE: 78 BPM
EOSINOPHIL # BLD: 0.1 K/UL (ref 0–0.8)
EOSINOPHIL NFR BLD: 2 % (ref 0.5–7.8)
ERYTHROCYTE [DISTWIDTH] IN BLOOD BY AUTOMATED COUNT: 14.6 % (ref 11.9–14.6)
GLOBULIN SER CALC-MCNC: 3.6 G/DL (ref 2.3–3.5)
GLUCOSE SERPL-MCNC: 100 MG/DL (ref 70–99)
HCT VFR BLD AUTO: 36.3 % (ref 35.8–46.3)
HGB BLD-MCNC: 11.6 G/DL (ref 11.7–15.4)
IMM GRANULOCYTES # BLD AUTO: 0 K/UL (ref 0–0.5)
IMM GRANULOCYTES NFR BLD AUTO: 0 % (ref 0–5)
LYMPHOCYTES # BLD: 1.9 K/UL (ref 0.5–4.6)
LYMPHOCYTES NFR BLD: 39 % (ref 13–44)
MCH RBC QN AUTO: 27.2 PG (ref 26.1–32.9)
MCHC RBC AUTO-ENTMCNC: 32 G/DL (ref 31.4–35)
MCV RBC AUTO: 85.2 FL (ref 82–102)
MONOCYTES # BLD: 0.4 K/UL (ref 0.1–1.3)
MONOCYTES NFR BLD: 8 % (ref 4–12)
NEUTS SEG # BLD: 2.4 K/UL (ref 1.7–8.2)
NEUTS SEG NFR BLD: 49 % (ref 43–78)
NRBC # BLD: 0 K/UL (ref 0–0.2)
PLATELET # BLD AUTO: 279 K/UL (ref 150–450)
PMV BLD AUTO: 8.2 FL (ref 9.4–12.3)
POTASSIUM SERPL-SCNC: 3.6 MMOL/L (ref 3.5–5.1)
PROT SERPL-MCNC: 7.1 G/DL (ref 6.3–8.2)
RBC # BLD AUTO: 4.26 M/UL (ref 4.05–5.2)
SODIUM SERPL-SCNC: 141 MMOL/L (ref 136–145)
TROPONIN T SERPL HS-MCNC: <6 NG/L (ref 0–14)
WBC # BLD AUTO: 4.8 K/UL (ref 4.3–11.1)

## 2024-12-02 PROCEDURE — 6360000002 HC RX W HCPCS: Performed by: EMERGENCY MEDICINE

## 2024-12-02 PROCEDURE — 71045 X-RAY EXAM CHEST 1 VIEW: CPT

## 2024-12-02 PROCEDURE — 85025 COMPLETE CBC W/AUTO DIFF WBC: CPT

## 2024-12-02 PROCEDURE — 93005 ELECTROCARDIOGRAM TRACING: CPT | Performed by: EMERGENCY MEDICINE

## 2024-12-02 PROCEDURE — 93010 ELECTROCARDIOGRAM REPORT: CPT | Performed by: INTERNAL MEDICINE

## 2024-12-02 PROCEDURE — 96374 THER/PROPH/DIAG INJ IV PUSH: CPT

## 2024-12-02 PROCEDURE — 80053 COMPREHEN METABOLIC PANEL: CPT

## 2024-12-02 PROCEDURE — 84484 ASSAY OF TROPONIN QUANT: CPT

## 2024-12-02 PROCEDURE — 99285 EMERGENCY DEPT VISIT HI MDM: CPT

## 2024-12-02 RX ORDER — KETOROLAC TROMETHAMINE 15 MG/ML
15 INJECTION, SOLUTION INTRAMUSCULAR; INTRAVENOUS
Status: COMPLETED | OUTPATIENT
Start: 2024-12-02 | End: 2024-12-02

## 2024-12-02 RX ADMIN — KETOROLAC TROMETHAMINE 15 MG: 15 INJECTION, SOLUTION INTRAMUSCULAR; INTRAVENOUS at 05:29

## 2024-12-02 ASSESSMENT — LIFESTYLE VARIABLES
HOW MANY STANDARD DRINKS CONTAINING ALCOHOL DO YOU HAVE ON A TYPICAL DAY: PATIENT DOES NOT DRINK
HOW OFTEN DO YOU HAVE A DRINK CONTAINING ALCOHOL: NEVER

## 2024-12-02 ASSESSMENT — PAIN - FUNCTIONAL ASSESSMENT: PAIN_FUNCTIONAL_ASSESSMENT: NONE - DENIES PAIN

## 2024-12-02 ASSESSMENT — PAIN SCALES - GENERAL: PAINLEVEL_OUTOF10: 6

## 2024-12-02 NOTE — ED TRIAGE NOTES
Patient reports to ED with chest pain. Patient describes the pain as an ache in her left breast area. Denies SOB or cardiac history.

## 2024-12-02 NOTE — ED PROVIDER NOTES
Emergency Department Provider Note       PCP: Naima Zamora MD   Age: 64 y.o.   Sex: female     DISPOSITION Decision To Discharge 12/02/2024 05:24:38 AM    ICD-10-CM    1. Acute chest pain  R07.9       2. Atypical chest pain  R07.89           Medical Decision Making     64-year-old AA female presents to the emergency department complaining of substernal chest discomfort occurring almost nightly for the last 2 to 3 weeks.  She states usually awakens her from sleep, and if she sits up it goes away.  She denies associated shortness of breath, diaphoresis, nausea, vomiting, fever or chills.  Sometimes she can just roll over in the bed a different position and it would go away as well.  She denies any MADDOX, PND or orthopnea.  On exam, the patient has no reproducible discomfort.  Abdomen is benign.  Patient was screened with an EKG which was essentially normal, chest x-ray which revealed no acute process, despite chest pain for 3 weeks troponin was less than 6, CMP and CBC normal.    ED Course as of 12/02/24 0526   Mon Dec 02, 2024   0422 ECG interpretation for ECG dated 12/2/2024 at 4:09 AM: ECG reveals a normal sinus rhythm rate of 78 bpm with normal MT and QTc intervals and normal axis.  Unremarkable ECG.  Mike Rabago MD   [BB]      ED Course User Index  [BB] Mike Rabago MD     1 or more acute illnesses that pose a threat to life or bodily function.   Shared medical decision making was utilized in creating the patients health plan today.  I independently ordered and reviewed each unique test.           I interpreted the X-rays chest x-ray reveals no acute cardiopulmonary process.              History     64-year-old AA female presents to the emergency department complaining of substernal chest discomfort occurring almost nightly for the last 2 to 3 weeks.  She states usually awakens her from sleep, and if she sits up it goes away.  She denies associated shortness of breath, diaphoresis, nausea,

## (undated) DEVICE — TOTAL 1-LAYER TRAY, LATEX FOLEY, 16FR 10: Brand: MEDLINE

## (undated) DEVICE — SUTURE VCRL SZ 3-0 L18IN ABSRB UD L26MM SH 1/2 CIR J864D

## (undated) DEVICE — SUTURE PDS II SZ 1 L96IN ABSRB VLT TP-1 L65MM 1/2 CIR Z880G

## (undated) DEVICE — SUTURE PROL SZ 0 L30IN NONABSORBABLE BLU L26MM CT-2 1/2 CIR 8412H

## (undated) DEVICE — CONTAINER SPEC HISTOLOGY 900ML POLYPR

## (undated) DEVICE — Device

## (undated) DEVICE — GOWN,SIRUS,NONRNF,SETINSLV,XL,20/CS: Brand: MEDLINE

## (undated) DEVICE — GARMENT,MEDLINE,DVT,INT,CALF,FOAM,MED: Brand: MEDLINE

## (undated) DEVICE — SUTURE PERMAHAND SZ 2-0 L12X18IN NONABSORBABLE BLK SILK A185H

## (undated) DEVICE — SUTURE VCRL + SZ 2-0 L36IN ABSRB UD L36MM CT-1 1/2 CIR VCP945H

## (undated) DEVICE — SPONGE LAP 18X18IN STRL -- 5/PK

## (undated) DEVICE — GLOVE ORANGE PI 7 1/2   MSG9075

## (undated) DEVICE — GLOVE SURG SZ 7 L12IN FNGR THK79MIL GRN LTX FREE

## (undated) DEVICE — CONTAINER SPEC FRMLN 120ML --

## (undated) DEVICE — ELECTRODE PT RET AD L9FT HI MOIST COND ADH HYDRGEL CORDED

## (undated) DEVICE — SUTURE PDS II SZ 0 L27IN ABSRB VLT L36MM CT-1 1/2 CIR Z340H

## (undated) DEVICE — REM POLYHESIVE ADULT PATIENT RETURN ELECTRODE: Brand: VALLEYLAB

## (undated) DEVICE — MDS601M2 INT-CS,MD 12- 18,MD,BL GY: Brand: MEDLINE

## (undated) DEVICE — MINOR SPLIT GENERAL: Brand: MEDLINE INDUSTRIES, INC.

## (undated) DEVICE — APPLICATOR MEDICATED 26 CC SOLUTION HI LT ORNG CHLORAPREP

## (undated) DEVICE — NEEDLE HYPO 21GA L1.5IN INTRAMUSCULAR S STL LATCH BVL UP

## (undated) DEVICE — SHEET, T, LAPAROTOMY, STERILE: Brand: MEDLINE

## (undated) DEVICE — GAUZE,SPONGE,4"X4",16PLY,STRL,LF,10/TRAY: Brand: MEDLINE